# Patient Record
Sex: FEMALE | Race: BLACK OR AFRICAN AMERICAN | Employment: UNEMPLOYED | URBAN - METROPOLITAN AREA
[De-identification: names, ages, dates, MRNs, and addresses within clinical notes are randomized per-mention and may not be internally consistent; named-entity substitution may affect disease eponyms.]

---

## 2021-02-08 ENCOUNTER — OFFICE VISIT (OUTPATIENT)
Dept: FAMILY MEDICINE CLINIC | Age: 61
End: 2021-02-08
Payer: COMMERCIAL

## 2021-02-08 VITALS
DIASTOLIC BLOOD PRESSURE: 80 MMHG | TEMPERATURE: 96.7 F | HEIGHT: 65 IN | RESPIRATION RATE: 16 BRPM | BODY MASS INDEX: 32.15 KG/M2 | HEART RATE: 88 BPM | WEIGHT: 193 LBS | SYSTOLIC BLOOD PRESSURE: 128 MMHG

## 2021-02-08 DIAGNOSIS — R10.13 DYSPEPSIA: ICD-10-CM

## 2021-02-08 DIAGNOSIS — F43.10 PTSD (POST-TRAUMATIC STRESS DISORDER): ICD-10-CM

## 2021-02-08 DIAGNOSIS — M17.11 ARTHRITIS OF RIGHT KNEE: ICD-10-CM

## 2021-02-08 DIAGNOSIS — Z79.52 IMMUNOSUPPRESSION DUE TO CHRONIC STEROID USE (HCC): ICD-10-CM

## 2021-02-08 DIAGNOSIS — D84.821 IMMUNOSUPPRESSION DUE TO CHRONIC STEROID USE (HCC): ICD-10-CM

## 2021-02-08 DIAGNOSIS — F41.1 GENERALIZED ANXIETY DISORDER: ICD-10-CM

## 2021-02-08 DIAGNOSIS — Z95.0 PACEMAKER: ICD-10-CM

## 2021-02-08 DIAGNOSIS — M32.12 SYSTEMIC LUPUS ERYTHEMATOSUS (SLE) WITH PERICARDITIS, UNSPECIFIED SLE TYPE (HCC): Primary | ICD-10-CM

## 2021-02-08 DIAGNOSIS — E55.9 VITAMIN D DEFICIENCY: ICD-10-CM

## 2021-02-08 DIAGNOSIS — M54.9 CHRONIC BACK PAIN, UNSPECIFIED BACK LOCATION, UNSPECIFIED BACK PAIN LATERALITY: ICD-10-CM

## 2021-02-08 DIAGNOSIS — Z87.411 HISTORY OF VAGINAL DYSPLASIA: ICD-10-CM

## 2021-02-08 DIAGNOSIS — G89.29 CHRONIC BACK PAIN, UNSPECIFIED BACK LOCATION, UNSPECIFIED BACK PAIN LATERALITY: ICD-10-CM

## 2021-02-08 DIAGNOSIS — Z87.410 HISTORY OF CERVICAL DYSPLASIA: ICD-10-CM

## 2021-02-08 DIAGNOSIS — T38.0X5A IMMUNOSUPPRESSION DUE TO CHRONIC STEROID USE (HCC): ICD-10-CM

## 2021-02-08 PROCEDURE — G8427 DOCREV CUR MEDS BY ELIG CLIN: HCPCS | Performed by: NURSE PRACTITIONER

## 2021-02-08 PROCEDURE — G8484 FLU IMMUNIZE NO ADMIN: HCPCS | Performed by: NURSE PRACTITIONER

## 2021-02-08 PROCEDURE — 1036F TOBACCO NON-USER: CPT | Performed by: NURSE PRACTITIONER

## 2021-02-08 PROCEDURE — 99204 OFFICE O/P NEW MOD 45 MIN: CPT | Performed by: NURSE PRACTITIONER

## 2021-02-08 PROCEDURE — 3017F COLORECTAL CA SCREEN DOC REV: CPT | Performed by: NURSE PRACTITIONER

## 2021-02-08 PROCEDURE — G8417 CALC BMI ABV UP PARAM F/U: HCPCS | Performed by: NURSE PRACTITIONER

## 2021-02-08 RX ORDER — FLUOXETINE 20 MG/1
20 TABLET, FILM COATED ORAL DAILY
COMMUNITY
End: 2021-03-22

## 2021-02-08 RX ORDER — DOXEPIN HYDROCHLORIDE 25 MG/1
50 CAPSULE ORAL NIGHTLY
COMMUNITY
End: 2021-03-22 | Stop reason: SDUPTHER

## 2021-02-08 RX ORDER — BLOOD-GLUCOSE METER
1 KIT MISCELLANEOUS DAILY
COMMUNITY
End: 2022-04-27

## 2021-02-08 RX ORDER — ERGOCALCIFEROL (VITAMIN D2) 1250 MCG
50000 CAPSULE ORAL WEEKLY
COMMUNITY
End: 2021-02-25

## 2021-02-08 RX ORDER — ALPRAZOLAM 0.5 MG/1
0.5 TABLET ORAL 2 TIMES DAILY PRN
COMMUNITY
End: 2021-03-22 | Stop reason: SDUPTHER

## 2021-02-08 RX ORDER — FAMOTIDINE 40 MG/1
40 TABLET, FILM COATED ORAL DAILY
COMMUNITY
End: 2022-03-28

## 2021-02-08 RX ORDER — PREDNISONE 20 MG/1
20 TABLET ORAL 2 TIMES DAILY
COMMUNITY
End: 2021-12-01

## 2021-02-08 SDOH — HEALTH STABILITY: MENTAL HEALTH: HOW OFTEN DO YOU HAVE A DRINK CONTAINING ALCOHOL?: NEVER

## 2021-02-08 SDOH — ECONOMIC STABILITY: INCOME INSECURITY: HOW HARD IS IT FOR YOU TO PAY FOR THE VERY BASICS LIKE FOOD, HOUSING, MEDICAL CARE, AND HEATING?: NOT HARD AT ALL

## 2021-02-08 SDOH — ECONOMIC STABILITY: TRANSPORTATION INSECURITY
IN THE PAST 12 MONTHS, HAS LACK OF TRANSPORTATION KEPT YOU FROM MEETINGS, WORK, OR FROM GETTING THINGS NEEDED FOR DAILY LIVING?: NO

## 2021-02-08 SDOH — ECONOMIC STABILITY: TRANSPORTATION INSECURITY
IN THE PAST 12 MONTHS, HAS THE LACK OF TRANSPORTATION KEPT YOU FROM MEDICAL APPOINTMENTS OR FROM GETTING MEDICATIONS?: NO

## 2021-02-08 ASSESSMENT — PATIENT HEALTH QUESTIONNAIRE - PHQ9
2. FEELING DOWN, DEPRESSED OR HOPELESS: 0
1. LITTLE INTEREST OR PLEASURE IN DOING THINGS: 0
SUM OF ALL RESPONSES TO PHQ QUESTIONS 1-9: 0

## 2021-02-08 ASSESSMENT — ENCOUNTER SYMPTOMS
COUGH: 0
ABDOMINAL PAIN: 0
NAUSEA: 0
SHORTNESS OF BREATH: 0
BACK PAIN: 1

## 2021-02-08 NOTE — PROGRESS NOTES
Referral made to DR Mario Palafox for 3/9/2021 at 10:30am.  Records faxed to [phone]. Patient notified at appt.

## 2021-02-08 NOTE — PATIENT INSTRUCTIONS
You may receive a survey about your visit with us today. The feedback from our patients helps us identify what is working well and where the service to all patients can be enhanced. Thank you! Tobacco Cessation Programs     Telephonic behavior modification  ? 1-800-QUIT-NOW (550-6951)  ? Counseling service for those who are ready to quit using tobacco.    ? Available for uninsured [place] residents, [place] recipients, pregnant women, or patients whose health plans or employers are members of the [address] [place] [address] behavior modification  ? http://[place]. Quitlogix. org  ? Online support program to help patients through each step of the quitting process. Available 24 hours a day 7 days a week. Provides up to date researched based tool, step-by-step guides, and motivational messages. Online behavior modification  ? www.lungusa.org/stop-smoking/how-to-quit  ? HelpLine: 1-800-LUNG-USA (969-7646)  ? Email questions to:  Stacey@[employer]. All[employer]   ? Website offers resources to help tobacco users figure out their reasons for quitting and then take the big step of quitting for good. Hypnosis  ? Location: [address] [place] [address] [place]  ? Contact: John Douglass, PhD at [phone]  ? Hypnosis for tobacco cessation  ? Cost $225 for the initial session and $175 for each session afterwards. Most patients require 6-8 sessions. There is the option to submit through the patients insurance. Hypnosis and behavior modification  ? Location: [place] [address],  [facility] [address] [place]  ? Contact: Mady Aguilar, PhD at [phone]  ? Counseling and hypnosis for nicotine addition  ? Cost: For uninsured patients:  Please call above phone number  Cost for insured patients depends on patients insurance plan. Behavior modification  ? Location: [facility], [address] [place] [address] [address] [place] AM [place], [place]  ?  Contact: [phone] ? Services include four one-on-one appointments between the patient and a respiratory therapist.  The four appointments span over three weeks. The respiratory therapist schedules one of the appointments to occur 48 hours after the patients quit date. ? Cost $100 total for the four sessions.   Tobacco cessation products are not included in the cost and are not provided by [facility].

## 2021-02-08 NOTE — PROGRESS NOTES
Subjective:      Patient ID: Lacey Cabrera 1960 is a 64 y.o. female here for evaluation. NP to establish care. Former PCP was    Past Medical History:   Diagnosis Date    Anxiety     Depression     PTSD (post-traumatic stress disorder)     Schizoaffective disorder ([facility] [address]        Past Surgical History:   Procedure Laterality Date    FOOT SURGERY Right     PACEMAKER INSERTION  1981       History reviewed. No pertinent family history. Current Outpatient Medications   Medication Sig Dispense Refill    predniSONE (DELTASONE) 20 MG tablet Take 20 mg by mouth 2 times daily      FLUoxetine (PROZAC) 20 MG tablet Take 20 mg by mouth daily      doxepin (SINEQUAN) 25 MG capsule Take 50 mg by mouth nightly      famotidine (PEPCID) 40 MG tablet Take 40 mg by mouth daily      blood glucose test strips (FREESTYLE LITE) strip 1 each by In Vitro route daily As needed  Freestyle Lite      ergocalciferol (ERGOCALCIFEROL) 1.25 MG (92405 UT) capsule Take 50,000 Units by mouth once a week      ALPRAZolam (XANAX) 0.5 MG tablet Take 0.5 mg by mouth 2 times daily as needed for Anxiety. No current facility-administered medications for this visit. Smoke: None    Colon Cancer Screening - 2014 in [address] - denied  Cervical Cancer Screening - due    Chief Complaint   Patient presents with   [facility].     Dr Gladies Gaucher in [facility]     currently on Xanax       Hx of LUPUS. Reason for PACER 40 years ago. On Prednisone 20 mg BID for LUPUS. Denies CP, SOB or chest tightness    High anxiety and PTSD. Hx of schizoaffective disorder. On Prozac  20 mg and Xanax BID    Right knee arthritis. Hx of joint INJ every 6 months. Was also following with a Back Doctor and PAIN DOCTOR. Vitals:    02/08/21 1349   BP: 128/80   Pulse: 88   Resp: 16   Temp: 96.7 °F (35.9 °C)       Recent blood work with PCP with results pending.      No results found for: LABA1C No results found for: EAG    No components found for: CHLPL  No results found for: TRIG  No results found for: HDL  No results found for: LDLCALC  No results found for: LABVLDL      Chemistry    No results found for: NA, K, CL, CO2, BUN, CREATININE No results found for: CALCIUM, ALKPHOS, AST, ALT, BILITOT         No results found for: TSH, A2WOGAK, R3ABLUR, THYROIDAB    No results found for: WBC, HGB, HCT, MCV, PLT      Health Maintenance   Topic Date Due    Hepatitis C screen  1960    HIV screen  09/28/1977    DTaP/Tdap/Td vaccine (1 - Tdap) 09/28/1981    Cervical cancer screen  09/28/1983    Lipid screen  09/28/2002    Diabetes screen  09/28/2002    Breast cancer screen  09/28/2012    Shingles Vaccine (1 of 2) 09/28/2012    Colon cancer screen colonoscopy  09/28/2012    Flu vaccine (1) 09/01/2020    Hepatitis A vaccine  Aged Out    Hepatitis B vaccine  Aged Out    Hib vaccine  Aged Out    Meningococcal (ACWY) vaccine  Aged Out    Pneumococcal 0-64 years Vaccine  Aged Out         There is no immunization history on file for this patient. Review of Systems   Constitutional: Negative for chills and fever. HENT: Negative. Respiratory: Negative for cough and shortness of breath. Cardiovascular: Negative for chest pain. Gastrointestinal: Negative for abdominal pain and nausea. Musculoskeletal: Positive for arthralgias and back pain. Skin: Negative for rash. Neurological: Negative for dizziness, light-headedness and headaches. Psychiatric/Behavioral: Positive for sleep disturbance. The patient is nervous/anxious. Objective:   Physical Exam  Constitutional:       General: She is not in acute distress. Eyes:      Pupils: Pupils are equal, round, and reactive to light. Neck:      Musculoskeletal: Normal range of motion and neck supple. Cardiovascular:      Rate and Rhythm: Normal rate and regular rhythm. Heart sounds: No murmur.    Pulmonary: Effort: Pulmonary effort is normal.      Breath sounds: Normal breath sounds. No wheezing. Abdominal:      General: Bowel sounds are normal. There is no distension. Palpations: Abdomen is soft. Tenderness: There is no abdominal tenderness. Musculoskeletal:      Right knee: She exhibits decreased range of motion. Tenderness found. Lumbar back: She exhibits decreased range of motion and pain. Skin:     General: Skin is warm and dry. Findings: No rash. Assessment:       Diagnosis Orders   1. Systemic lupus erythematosus (SLE) with pericarditis, unspecified SLE type (HCC)  predniSONE (DELTASONE) 20 MG tablet   2. Pacemaker     3. Immunosuppression due to chronic steroid use     4. Arthritis of right knee  blood glucose test strips (FREESTYLE LITE) strip   5. PTSD (post-traumatic stress disorder)  FLUoxetine (PROZAC) 20 MG tablet    doxepin (SINEQUAN) 25 MG capsule    ALPRAZolam (XANAX) 0.5 MG tablet   6. Generalized anxiety disorder  ALPRAZolam (XANAX) 0.5 MG tablet   7. Vitamin D deficiency  ergocalciferol (ERGOCALCIFEROL) 1.25 MG (20208 UT) capsule   8. Chronic back pain, unspecified back location, unspecified back pain laterality     9. History of cervical dysplasia  Diaz Pruitt MD, Obstetrics & Gynecology, [facility]. History of vaginal dysplasia  Diaz Pruitt MD, Obstetrics & Gynecology, [facility].VIERT   11.  Dyspepsia  famotidine (PEPCID) 40 MG tablet           Plan:      Difficult gathering PMhx from patient  Get old records from PCP and Specialist  Will set up with LOCAL SPECIALIST upon review  Refer to OBGYN due to cervical/vaginal dysplasia hx  Chronic conditions stable  Labs just complete at previous PCP - will get records  Unknown Preventative UTD  RTO in 2 weeks to reviewed PMhx            Current Outpatient Medications   Medication Sig Dispense Refill    predniSONE (DELTASONE) 20 MG tablet Take 20 mg by mouth 2 times daily

## 2021-02-25 ENCOUNTER — TELEPHONE (OUTPATIENT)
Dept: CARDIOLOGY CLINIC | Age: 61
End: 2021-02-25

## 2021-02-25 ENCOUNTER — OFFICE VISIT (OUTPATIENT)
Dept: FAMILY MEDICINE CLINIC | Age: 61
End: 2021-02-25
Payer: COMMERCIAL

## 2021-02-25 VITALS
HEART RATE: 76 BPM | RESPIRATION RATE: 20 BRPM | WEIGHT: 196.3 LBS | TEMPERATURE: 97.3 F | BODY MASS INDEX: 32.67 KG/M2 | DIASTOLIC BLOOD PRESSURE: 68 MMHG | SYSTOLIC BLOOD PRESSURE: 114 MMHG

## 2021-02-25 DIAGNOSIS — F43.10 PTSD (POST-TRAUMATIC STRESS DISORDER): ICD-10-CM

## 2021-02-25 DIAGNOSIS — F41.1 GENERALIZED ANXIETY DISORDER: ICD-10-CM

## 2021-02-25 DIAGNOSIS — R04.2 COUGHING UP BLOOD: ICD-10-CM

## 2021-02-25 DIAGNOSIS — Z95.0 PACEMAKER: ICD-10-CM

## 2021-02-25 DIAGNOSIS — J18.1 RIGHT UPPER LOBE CONSOLIDATION (HCC): ICD-10-CM

## 2021-02-25 DIAGNOSIS — Z86.79 HISTORY OF COMPLETE AV BLOCK: ICD-10-CM

## 2021-02-25 DIAGNOSIS — E55.9 VITAMIN D DEFICIENCY: ICD-10-CM

## 2021-02-25 DIAGNOSIS — M17.11 OSTEOARTHRITIS OF RIGHT KNEE, UNSPECIFIED OSTEOARTHRITIS TYPE: ICD-10-CM

## 2021-02-25 DIAGNOSIS — M32.12 SYSTEMIC LUPUS ERYTHEMATOSUS (SLE) WITH PERICARDITIS, UNSPECIFIED SLE TYPE (HCC): Primary | ICD-10-CM

## 2021-02-25 PROCEDURE — G8417 CALC BMI ABV UP PARAM F/U: HCPCS | Performed by: NURSE PRACTITIONER

## 2021-02-25 PROCEDURE — 3017F COLORECTAL CA SCREEN DOC REV: CPT | Performed by: NURSE PRACTITIONER

## 2021-02-25 PROCEDURE — 99215 OFFICE O/P EST HI 40 MIN: CPT | Performed by: NURSE PRACTITIONER

## 2021-02-25 PROCEDURE — 1036F TOBACCO NON-USER: CPT | Performed by: NURSE PRACTITIONER

## 2021-02-25 PROCEDURE — G8427 DOCREV CUR MEDS BY ELIG CLIN: HCPCS | Performed by: NURSE PRACTITIONER

## 2021-02-25 PROCEDURE — G8484 FLU IMMUNIZE NO ADMIN: HCPCS | Performed by: NURSE PRACTITIONER

## 2021-02-25 ASSESSMENT — ENCOUNTER SYMPTOMS
NAUSEA: 0
SHORTNESS OF BREATH: 0
BACK PAIN: 1
COUGH: 1
ABDOMINAL PAIN: 0

## 2021-02-25 NOTE — TELEPHONE ENCOUNTER
NEW PT REFERRAL FROM [facility] . I CALLED PT AND SHE IS DRIVING RIGHT NOW. SHE HAS OUR PHONE NUMBER. SHE IS GOING TO CALL OUR OFFICE AS SOON AS SHE GETS HOME TO SET UP AN APPT WITH DR RAMIREZ. PT TOLD ME SHE HAS A DEVICE IN HER CHEST AND SHE THINKS IT IS AN ICD. SHE IS NOT SURE. SHE SAID SHE HAS NOT HAD IT CHECKED IN YEARS.  SHE MOVED TO [place] AND IS TRYING TO ESTABLISH NEW DRS

## 2021-02-25 NOTE — PROGRESS NOTES
Visit Information    Have you changed or started any medications since your last visit including any over-the-counter medicines, vitamins, or herbal medicines? no   Are you having any side effects from any of your medications? -  no  Have you stopped taking any of your medications? Is so, why? -  yes - tx complete    Have you seen any other physician or provider since your last visit? No  Have you had any other diagnostic tests since your last visit? No  Have you been seen in the emergency room and/or had an admission to a hospital since we last saw you? No  Have you had your routine dental cleaning in the past 6 months? no    Have you activated your [employer] account? If not, what are your barriers?  Yes     Patient Care Team:  ESTHER Peters CNP as PCP - General (Family Nurse Practitioner)  ESTHER Peters CNP as PCP - [facility] Provider    Medical History Review  Past Medical, Family, and Social History reviewed and does contribute to the patient presenting condition    Health Maintenance   Topic Date Due    Hepatitis C screen  1960    Pneumococcal 0-64 years Vaccine (1 of 3 - PCV13) 09/28/1968    HIV screen  09/28/1977    DTaP/Tdap/Td vaccine (1 - Tdap) 09/28/1981    Cervical cancer screen  09/28/1983    Lipid screen  09/28/2002    Diabetes screen  09/28/2002    Breast cancer screen  09/28/2012    Colon cancer screen colonoscopy  09/28/2012    Flu vaccine (1) 09/01/2020    Hepatitis A vaccine  Aged Out    Hepatitis B vaccine  Aged Out    Hib vaccine  Aged Out    Meningococcal (ACWY) vaccine  Aged Out

## 2021-02-25 NOTE — PROGRESS NOTES
Subjective:      Patient ID: Joel Saini 1960 is a 64 y.o. female here for evaluation. NP to establish care. Former PCP was    Chief Complaint   Patient presents with    2 Week Follow-Up     Smoke: None    Colon Cancer Screening - 2014 in [address] - denied    Hx of LUPUS. Reason for PACER 40 years ago. On Prednisone 20 mg BID PRN for LUPUS. Denies CP, SOB or chest tightness. Coughing up blood. Off and on. Seen PULM in past.  No follow up to CT abnormal in 2019. Denies weight loss. Denies night sweats. Denies frequent cough. CT CHEST 7/2019  IMPRESSION:       1.  Right upper lobe spiculated nodular opacity, highly suspicious for     malignancy.  Recommend follow-up as described below.    2.  No evidence of bulky intrathoracic lymphadenopathy.        Incidental Finding:  Follow-up for this incidentally detected lung nodule     with PET/CT or biopsy within 4 weeks, or chest CT exam in 3 months is     recommended.      Hx of PACER in late 90s. Seen CARDIO in mid 2010s per notes that pacer was non-operational.  Heart was pacing without. Pacer was left due no issues despite non-functional status. Patient has not seen CARDIO since. High anxiety and PTSD. Hx of schizoaffective disorder. On Prozac  20 mg and Xanax BID    Right knee arthritis. Hx of joint INJ every 6 months. Was also following with a Back Doctor and PAIN DOCTOR. Vitals:    02/25/21 1317   BP: 114/68   Pulse: 76   Resp: 20   Temp: 97.3 °F (36.3 °C)       Recent blood work with PCP with results pending.      No results found for: LABA1C  No results found for: EAG    No components found for: CHLPL  No results found for: TRIG  No results found for: HDL  No results found for: LDLCALC  No results found for: LABVLDL      Chemistry    No results found for: NA, K, CL, CO2, BUN, CREATININE No results found for: CALCIUM, ALKPHOS, AST, ALT, BILITOT         No results found for: TSH, L7TGLPU, K0FGGSW,

## 2021-02-26 ENCOUNTER — TELEPHONE (OUTPATIENT)
Dept: FAMILY MEDICINE CLINIC | Age: 61
End: 2021-02-26

## 2021-02-26 NOTE — TELEPHONE ENCOUNTER
Jenny referred Queenie to Dr Maurizio Lake, Please order CMP, all other required bloodwork has been ordered.

## 2021-02-26 NOTE — TELEPHONE ENCOUNTER
Has been scheduled with device clinic with note to schedule with henrique, wanted to see what the device is doing first

## 2021-03-03 ENCOUNTER — OFFICE VISIT (OUTPATIENT)
Dept: CARDIOLOGY CLINIC | Age: 61
End: 2021-03-03
Payer: COMMERCIAL

## 2021-03-03 ENCOUNTER — NURSE ONLY (OUTPATIENT)
Dept: CARDIOLOGY CLINIC | Age: 61
End: 2021-03-03

## 2021-03-03 ENCOUNTER — HOSPITAL ENCOUNTER (OUTPATIENT)
Dept: GENERAL RADIOLOGY | Age: 61
Discharge: HOME | End: 2021-03-03
Payer: COMMERCIAL

## 2021-03-03 ENCOUNTER — HOSPITAL ENCOUNTER (OUTPATIENT)
Age: 61
Discharge: HOME | End: 2021-03-03
Payer: COMMERCIAL

## 2021-03-03 VITALS
DIASTOLIC BLOOD PRESSURE: 86 MMHG | HEART RATE: 67 BPM | WEIGHT: 204 LBS | BODY MASS INDEX: 32.78 KG/M2 | SYSTOLIC BLOOD PRESSURE: 123 MMHG | HEIGHT: 66 IN

## 2021-03-03 DIAGNOSIS — Z95.0 PACEMAKER: Primary | ICD-10-CM

## 2021-03-03 DIAGNOSIS — M32.12 SYSTEMIC LUPUS ERYTHEMATOSUS (SLE) WITH PERICARDITIS, UNSPECIFIED SLE TYPE (HCC): ICD-10-CM

## 2021-03-03 DIAGNOSIS — Z95.0 PACEMAKER: ICD-10-CM

## 2021-03-03 DIAGNOSIS — J18.1 RIGHT UPPER LOBE CONSOLIDATION (HCC): ICD-10-CM

## 2021-03-03 LAB
BUN BLDV-MCNC: 12 MG/DL (ref 7–22)
C-REACTIVE PROTEIN: 1.13 MG/DL (ref 0–1)
CREAT SERPL-MCNC: 0.7 MG/DL (ref 0.4–1.2)
ERYTHROCYTE [DISTWIDTH] IN BLOOD BY AUTOMATED COUNT: 13.9 % (ref 11.5–14.5)
ERYTHROCYTE [DISTWIDTH] IN BLOOD BY AUTOMATED COUNT: 42.6 FL (ref 35–45)
HCT VFR BLD CALC: 38.4 % (ref 37–47)
HEMOGLOBIN: 12.1 GM/DL (ref 12–16)
MCH RBC QN AUTO: 26.5 PG (ref 26–33)
MCHC RBC AUTO-ENTMCNC: 31.5 GM/DL (ref 32.2–35.5)
MCV RBC AUTO: 84.2 FL (ref 81–99)
PLATELET # BLD: 300 THOU/MM3 (ref 130–400)
PMV BLD AUTO: 9.2 FL (ref 9.4–12.4)
RBC # BLD: 4.56 MILL/MM3 (ref 4.2–5.4)
RHEUMATOID FACTOR: < 10 IU/ML (ref 0–13)
SEDIMENTATION RATE, ERYTHROCYTE: 32 MM/HR (ref 0–20)
WBC # BLD: 4.3 THOU/MM3 (ref 4.8–10.8)

## 2021-03-03 PROCEDURE — 86140 C-REACTIVE PROTEIN: CPT

## 2021-03-03 PROCEDURE — G8417 CALC BMI ABV UP PARAM F/U: HCPCS | Performed by: INTERNAL MEDICINE

## 2021-03-03 PROCEDURE — 3017F COLORECTAL CA SCREEN DOC REV: CPT | Performed by: INTERNAL MEDICINE

## 2021-03-03 PROCEDURE — 82565 ASSAY OF CREATININE: CPT

## 2021-03-03 PROCEDURE — 85027 COMPLETE CBC AUTOMATED: CPT

## 2021-03-03 PROCEDURE — 83516 IMMUNOASSAY NONANTIBODY: CPT

## 2021-03-03 PROCEDURE — 86038 ANTINUCLEAR ANTIBODIES: CPT

## 2021-03-03 PROCEDURE — 99203 OFFICE O/P NEW LOW 30 MIN: CPT | Performed by: INTERNAL MEDICINE

## 2021-03-03 PROCEDURE — G8427 DOCREV CUR MEDS BY ELIG CLIN: HCPCS | Performed by: INTERNAL MEDICINE

## 2021-03-03 PROCEDURE — G8484 FLU IMMUNIZE NO ADMIN: HCPCS | Performed by: INTERNAL MEDICINE

## 2021-03-03 PROCEDURE — 85651 RBC SED RATE NONAUTOMATED: CPT

## 2021-03-03 PROCEDURE — 71046 X-RAY EXAM CHEST 2 VIEWS: CPT

## 2021-03-03 PROCEDURE — 93000 ELECTROCARDIOGRAM COMPLETE: CPT | Performed by: INTERNAL MEDICINE

## 2021-03-03 PROCEDURE — 86430 RHEUMATOID FACTOR TEST QUAL: CPT

## 2021-03-03 PROCEDURE — 1036F TOBACCO NON-USER: CPT | Performed by: INTERNAL MEDICINE

## 2021-03-03 PROCEDURE — 84520 ASSAY OF UREA NITROGEN: CPT

## 2021-03-03 PROCEDURE — 36415 COLL VENOUS BLD VENIPUNCTURE: CPT

## 2021-03-03 NOTE — PROGRESS NOTES
[facility]  [place]pt: [phone]         CARDIAC ELECTROPHYSIOLOGY: CONSULTATION NOTE  PATIENT DEMOGRAPHICS:  Date:   3/3/2021  Patient name:              Karen Nicole  YOB: 1960  Sex: female   MRN:   [id number]    PRIMARY CARE PHYSICIAN:   ESTHER Busby CNP    REFERRING PROVIDER:  Self referred. REASON FOR CONSULTATION:  Establish care care with [facility]. HISTORY OF PRESENT ILLNESS:  Ms. Osman Goode is a 64years old lady with history of sinus arrest with asystole resulting in syncope and seizures. She underwent dual-chamber pacemaker implantation in 1981. During evaluation in 2002 broken pacemaker leads. Since patient did not have recurrent syncope a conservative management was recommended. She recently moved from family to live in [facility] and wants to establish cardiac care with a Verneta Lennox. Her medical comorbidities include obesity, systemic lupus erythematosus (in remission) and hyperlipidemia. She complains of intermittent mild hemoptysis. She is scheduled to have a CT angiogram of the chest. Denies chest pain, shortness of breath, palpitation, syncope or lower extremity swelling. Active and functional.    REVIEW OF SYSTEMS:    Constitutional: Negative for chills and fever  HENT: Negative for congestion, sinus pressure, sneezing and sore throat. Eyes: Negative for pain, discharge, redness and itching. Respiratory: Negative for apnea, cough  Gastrointestinal: Negative for blood in stool, constipation, diarrhea   Endocrine: Negative for cold intolerance, heat intolerance, polydipsia. Genitourinary: Negative for dysuria, enuresis, flank pain and hematuria. Musculoskeletal: Negative for arthralgias, joint swelling and neck pain. Neurological: Negative for numbness and headaches.    Psychiatric/Behavioral: Negative for agitation, confusion, decreased concentration and dysphoric mood.      PAST MEDICAL HISTORY:   Sinus arrest with asystole status post dual-chamber pacemaker implantation in . Broken pacemaker leads (etiology not known). 2.  Systemic lupus erythematosus in remission. on prednisone. 3.  Hyperlipidemia. 4.  Chronic ulcer pulmonary disease, mild. 5.  Gastroesophageal reflux disease. 6.  Hemoptysis under evaluation. Past Medical History:   Diagnosis Date    Anxiety     Depression     PTSD (post-traumatic stress disorder)     Schizoaffective disorder (HCC)        PSH:  Past Surgical History:   Procedure Laterality Date    FOOT SURGERY Right     PACEMAKER INSERTION         FAMILY HISTORY:  No family history on file. SOCIAL HISTORY:  Denies smoking or alcohol use. She has one daughter. She is single.   Social History     Socioeconomic History    Marital status: Single     Spouse name: None    Number of children: 1    Years of education: 15    Highest education level: Associate degree: academic program   Occupational History    None   Social Needs    Financial resource strain: Not hard at all   [employer] insecurity     Worry: Never true     Inability: Never true   [employer] needs     Medical: No     Non-medical: No   Tobacco Use    Smoking status: Former Smoker     Packs/day: 0.25     Years: 30.00     Pack years: 7.50     Types: Cigarettes     Quit date: 2009     Years since quittin.1    Smokeless tobacco: Never Used   Substance and Sexual Activity    Alcohol use: Never     Frequency: Never    Drug use: Not Currently     Types: Marijuana     Comment:     Sexual activity: None   Lifestyle    Physical activity     Days per week: None     Minutes per session: None    Stress: None   Relationships    Social connections     Talks on phone: None     Gets together: None     Attends Christianity service: None     Active member of club or organization: None     Attends meetings of clubs or organizations: None     Relationship status: None    Intimate partner violence     Fear of current or ex partner: None     Emotionally abused: None     Physically abused: None     Forced sexual activity: None   Other Topics Concern    None   Social History Narrative    None        ALLERGY HISTORY:  Allergies   Allergen Reactions    Naproxen Other (See Comments)     ears ring        MEDICATIONS:  Current Outpatient Medications   Medication Sig Dispense Refill    predniSONE (DELTASONE) 20 MG tablet Take 20 mg by mouth 2 times daily      FLUoxetine (PROZAC) 20 MG tablet Take 20 mg by mouth daily      doxepin (SINEQUAN) 25 MG capsule Take 50 mg by mouth nightly      famotidine (PEPCID) 40 MG tablet Take 40 mg by mouth daily      ALPRAZolam (XANAX) 0.5 MG tablet Take 0.5 mg by mouth 2 times daily as needed for Anxiety.  blood glucose test strips (FREESTYLE LITE) strip 1 each by In Vitro route daily As needed  Freestyle Lite       No current facility-administered medications for this visit. PHYSICAL EXAM:  Vitals:    03/03/21 1323   BP: 123/86   Pulse: 67     Body mass index is 32.93 kg/m². GENERAL: Alert and oriented. No distress. EYES: No pallor or icterus. ENT: No central cyanosis. VESSELS: No jugular venous distension or carotid bruits. HEART: Normal S1/S2. No murmur, rub or gallop. LUNGS: Clear to auscultation. ABDOMEN: Soft and non-tender. EXTREMITIES: No lower extremity edema. Feet are warm. NEUROLOGICAL: Grossly intact. LABORATORY DATA AND DIAGNOSTIC DATA:  I have personally reviewed and interpreted the results of the following diagnostic testing    No results found for: WBC, HGB, HCT, PLT, CHOL, TRIG, HDL, LDLDIRECT, ALT, AST, NA, K, CL, CREATININE, BUN, CO2, TSH, INR, GLUF, LABA1C, LABMICR     Echocardiogram from outside hospital in 2017: LVEF 50%. Mild ex regurgitation. ECG sinus rhythm and isolated premature atrial complexes.   Device interrogation: [facility]. No connection between the  and device could be made. Chest x-ray: Fractured both atrial and ventricular lead. T    IMPRESSION:  1. History of sinus arrest/asystole resulting in seizures, no recurrences. 2.  Dual-chamber pacemaker implanted with fractured leads. 3.  Obesity, BMI 32 kg/m². 4.  Hemoptysis under evaluation. Plan to undergo CT angiogram of the chest.  5.  Systemic lupus erythematosus in remission, on prednisone. ASSESSMENT AND RECOMMENDATIONS:  The patient is known to have fractured pacemaker leads. He has had no recurrent syncope/seizures or evidence of bradycardia arrhythmias. No connection between the [facility] and pacemaker generator was established. Since, she is currently free from bradyarrhythmias we will proceed with conservative management. I did explain to her that lead in her case will be a high risk procedure. If she has recurrent bradyarrhythmias we may consider implantation of the new system. She is planned to have a CT angiogram of the chest to make sure the broken leads are not causing any lung complications. I discussed the management plan with the patient in details. She verbalized understanding of the discussion. If documented to have a pulmonary complication from fracture leads we will see her sooner otherwise follow-up in a year.     Electronically signed by Shefali Espinosa MD, Pippa Sabillon on 3/3/2021 at 2:11 PM

## 2021-03-03 NOTE — PROGRESS NOTES
This pt came to our office today. She moved to this area from [place] . She said she has not had her device checked in a long time. She used to see dr Melchor Smith in [facility]. I called there to get records / they told me she has not been there since 2017. She will fax me what she has .  We were unable to do a device check since her device WAS PLACED IN 1981  WE BROUGHT DR RAMIREZ IN TO SEE THIS PT/ HE WANTS HER TO GO DOWN AND GET A CHEST XRAY AND COME BACK TO THE OFFICE SO HE CAN SEE HER

## 2021-03-03 NOTE — PROGRESS NOTES
EKG obtained    Patient denies light headed, dizziness, SOB, palpitations or heart racing sensations.

## 2021-03-06 LAB — ANA SCREEN: NORMAL

## 2021-03-08 LAB — MISC. #1 REFERENCE GROUP TEST: NORMAL

## 2021-03-12 ENCOUNTER — OFFICE VISIT (OUTPATIENT)
Dept: FAMILY MEDICINE CLINIC | Age: 61
End: 2021-03-12
Payer: COMMERCIAL

## 2021-03-12 VITALS
DIASTOLIC BLOOD PRESSURE: 68 MMHG | RESPIRATION RATE: 20 BRPM | WEIGHT: 201.9 LBS | TEMPERATURE: 97.2 F | HEART RATE: 88 BPM | SYSTOLIC BLOOD PRESSURE: 112 MMHG | BODY MASS INDEX: 32.59 KG/M2

## 2021-03-12 DIAGNOSIS — M25.50 ARTHRALGIA OF MULTIPLE JOINTS: ICD-10-CM

## 2021-03-12 DIAGNOSIS — F41.1 GENERALIZED ANXIETY DISORDER: ICD-10-CM

## 2021-03-12 DIAGNOSIS — J18.1 RIGHT UPPER LOBE CONSOLIDATION (HCC): ICD-10-CM

## 2021-03-12 DIAGNOSIS — M17.11 OSTEOARTHRITIS OF RIGHT KNEE, UNSPECIFIED OSTEOARTHRITIS TYPE: ICD-10-CM

## 2021-03-12 DIAGNOSIS — M32.12 SYSTEMIC LUPUS ERYTHEMATOSUS (SLE) WITH PERICARDITIS, UNSPECIFIED SLE TYPE (HCC): Primary | ICD-10-CM

## 2021-03-12 DIAGNOSIS — Z95.0 PACEMAKER: ICD-10-CM

## 2021-03-12 DIAGNOSIS — F43.10 PTSD (POST-TRAUMATIC STRESS DISORDER): ICD-10-CM

## 2021-03-12 PROCEDURE — G8417 CALC BMI ABV UP PARAM F/U: HCPCS | Performed by: NURSE PRACTITIONER

## 2021-03-12 PROCEDURE — G8484 FLU IMMUNIZE NO ADMIN: HCPCS | Performed by: NURSE PRACTITIONER

## 2021-03-12 PROCEDURE — 1036F TOBACCO NON-USER: CPT | Performed by: NURSE PRACTITIONER

## 2021-03-12 PROCEDURE — G8427 DOCREV CUR MEDS BY ELIG CLIN: HCPCS | Performed by: NURSE PRACTITIONER

## 2021-03-12 PROCEDURE — 3017F COLORECTAL CA SCREEN DOC REV: CPT | Performed by: NURSE PRACTITIONER

## 2021-03-12 PROCEDURE — 99214 OFFICE O/P EST MOD 30 MIN: CPT | Performed by: NURSE PRACTITIONER

## 2021-03-12 ASSESSMENT — ENCOUNTER SYMPTOMS
NAUSEA: 0
COUGH: 1
ABDOMINAL PAIN: 0
BACK PAIN: 1
SHORTNESS OF BREATH: 0

## 2021-03-12 NOTE — PROGRESS NOTES
Visit Information    Have you changed or started any medications since your last visit including any over-the-counter medicines, vitamins, or herbal medicines? no   Are you having any side effects from any of your medications? -  no  Have you stopped taking any of your medications? Is so, why? -  no    Have you seen any other physician or provider since your last visit? No  Have you had any other diagnostic tests since your last visit? No  Have you been seen in the emergency room and/or had an admission to a hospital since we last saw you? No  Have you had your routine dental cleaning in the past 6 months? no    Have you activated your [employer] account? If not, what are your barriers?  Yes     Patient Care Team:  ESTHER Ricks CNP as PCP - General (Family Nurse Practitioner)  ESTHER Ricks CNP as PCP - [facility] Provider    Medical History Review  Past Medical, Family, and Social History reviewed and does contribute to the patient presenting condition    Health Maintenance   Topic Date Due    Hepatitis C screen  Never done    Pneumococcal 0-64 years Vaccine (1 of 3 - PCV13) Never done    HIV screen  Never done    DTaP/Tdap/Td vaccine (1 - Tdap) Never done    Cervical cancer screen  Never done    Diabetes screen  Never done    Breast cancer screen  Never done    Colon cancer screen colonoscopy  Never done    Flu vaccine (1) Never done    COVID-19 Vaccine (2 - Moderna 2-dose series) 03/26/2021    Lipid screen  02/04/2026    Hepatitis A vaccine  Aged Out    Hepatitis B vaccine  Aged Out    Hib vaccine  Aged Out    Meningococcal (ACWY) vaccine  Aged Out

## 2021-03-12 NOTE — PROGRESS NOTES
Subjective:      Patient ID: Anna Valera 1960 is a 64 y.o. female here for evaluation. NP to establish care. Former PCP was    Chief Complaint   Patient presents with    Discuss Labs     Smoke: None    Colon Cancer Screening - 2014 in [address] - denied    Hx of LUPUS. Reason for PACER 40 years ago. On Prednisone 20 mg BID PRN for LUPUS. Denies CP, SOB or chest tightness. Coughing up blood. Off and on. Seen PULM in past.  No follow up to CT abnormal in 2019. Denies weight loss. Denies night sweats. Denies frequent cough. Scheduled for CT Chest on 4/7/21. Would like to see Lory Scales. CT CHEST 7/2019  IMPRESSION:       1.  Right upper lobe spiculated nodular opacity, highly suspicious for     malignancy.  Recommend follow-up as described below.    2.  No evidence of bulky intrathoracic lymphadenopathy.        Incidental Finding:  Follow-up for this incidentally detected lung nodule     with PET/CT or biopsy within 4 weeks, or chest CT exam in 3 months is     recommended.      Hx of PACER in late 90s. Seen CARDIO in mid 2010s per notes that pacer was non-operational.  Heart was pacing without. Pacer was left due no issues despite non-functional status. Seen CARDIO - awaiting CT results to make sure leads are not affecting lungs. High anxiety and PTSD. Hx of schizoaffective disorder. On Prozac  20 mg and Xanax BID    Right knee arthritis. Hx of joint INJ every 6 months. Was also following with a Back Doctor and PAIN DOCTOR. She is holding off on ORTHO until lungs are clear. Vitals:    03/12/21 1053   BP: 112/68   Pulse: 88   Resp: 20   Temp: 97.2 °F (36.2 °C)       Labs reviewed.      No results found for: LABA1C  No results found for: EAG    No components found for: CHLPL  No results found for: TRIG  No results found for: HDL  No results found for: LDLCALC  No results found for: LABVLDL      Chemistry        Component Value Date/Time    BUN 12 Pulmonary effort is normal.      Breath sounds: Normal breath sounds. No wheezing. Abdominal:      General: Bowel sounds are normal. There is no distension. Palpations: Abdomen is soft. Tenderness: There is no abdominal tenderness. Musculoskeletal:      Right knee: She exhibits decreased range of motion. Tenderness found. Lumbar back: She exhibits decreased range of motion and pain. Skin:     General: Skin is warm and dry. Findings: No rash. Assessment:       Diagnosis Orders   1. Systemic lupus erythematosus (SLE) with pericarditis, unspecified SLE type [facility])  External Referral To Rheumatology   2. Arthralgia of multiple joints  External Referral To Rheumatology   3. Pacemaker     4. Right upper lobe consolidation ([address]     5. Osteoarthritis of right knee, unspecified osteoarthritis type     6. PTSD (post-traumatic stress disorder)     7. Generalized anxiety disorder             Plan:      Refer to [address] [place] [address] per patient requesdt  Refer to [address] [facility] upon CT completion  CT Chest pending 4/7/21   - abnormal right upper lobe in 2019 with no follow up  Chronic conditions stable  Labs reviewed  Healthy Lifestyles discussed  KNA            Current Outpatient Medications   Medication Sig Dispense Refill    predniSONE (DELTASONE) 20 MG tablet Take 20 mg by mouth 2 times daily      FLUoxetine (PROZAC) 20 MG tablet Take 20 mg by mouth daily      doxepin (SINEQUAN) 25 MG capsule Take 50 mg by mouth nightly      famotidine (PEPCID) 40 MG tablet Take 40 mg by mouth daily      blood glucose test strips (FREESTYLE LITE) strip 1 each by In Vitro route daily As needed  Freestyle Lite      ALPRAZolam (XANAX) 0.5 MG tablet Take 0.5 mg by mouth 2 times daily as needed for Anxiety. No current facility-administered medications for this visit.

## 2021-03-15 ENCOUNTER — TELEPHONE (OUTPATIENT)
Dept: FAMILY MEDICINE CLINIC | Age: 61
End: 2021-03-15

## 2021-03-15 NOTE — TELEPHONE ENCOUNTER
Referral to [facility] faxed today to [phone]. Pt aware the they will call her to schedule. Referral form given to provider to sign for faxing. OV 3/12/21 also attached.

## 2021-03-17 ENCOUNTER — HOSPITAL ENCOUNTER (OUTPATIENT)
Age: 61
Discharge: HOME | End: 2021-03-17
Payer: COMMERCIAL

## 2021-03-17 LAB
GFR SERPL CREATININE-BSD FRML MDRD: > 90 ML/MIN/1.73M2
HEPATITIS B SURFACE ANTIGEN: NEGATIVE
HEPATITIS C ANTIBODY: NEGATIVE

## 2021-03-17 PROCEDURE — 36415 COLL VENOUS BLD VENIPUNCTURE: CPT

## 2021-03-17 PROCEDURE — 86592 SYPHILIS TEST NON-TREP QUAL: CPT

## 2021-03-17 PROCEDURE — 87389 HIV-1 AG W/HIV-1&-2 AB AG IA: CPT

## 2021-03-17 PROCEDURE — 86803 HEPATITIS C AB TEST: CPT

## 2021-03-17 PROCEDURE — 87340 HEPATITIS B SURFACE AG IA: CPT

## 2021-03-18 LAB
HIV AG/AB: NONREACTIVE
RPR: NONREACTIVE

## 2021-03-22 DIAGNOSIS — F41.1 GENERALIZED ANXIETY DISORDER: ICD-10-CM

## 2021-03-22 DIAGNOSIS — F43.10 PTSD (POST-TRAUMATIC STRESS DISORDER): ICD-10-CM

## 2021-03-22 DIAGNOSIS — M32.12 SYSTEMIC LUPUS ERYTHEMATOSUS (SLE) WITH PERICARDITIS, UNSPECIFIED SLE TYPE (HCC): ICD-10-CM

## 2021-03-22 RX ORDER — PREDNISONE 20 MG/1
20 TABLET ORAL 2 TIMES DAILY
Status: CANCELLED | OUTPATIENT
Start: 2021-03-22

## 2021-03-22 RX ORDER — ALPRAZOLAM 0.5 MG/1
0.5 TABLET ORAL 2 TIMES DAILY PRN
Qty: 30 TABLET | Refills: 0 | Status: SHIPPED | OUTPATIENT
Start: 2021-03-22 | End: 2021-04-21

## 2021-03-22 RX ORDER — DOXEPIN HYDROCHLORIDE 50 MG/1
50 CAPSULE ORAL NIGHTLY
Qty: 30 CAPSULE | Refills: 11 | Status: SHIPPED | OUTPATIENT
Start: 2021-03-22 | End: 2022-07-13

## 2021-03-22 RX ORDER — ROSUVASTATIN CALCIUM 10 MG/1
TABLET, COATED ORAL
COMMUNITY
Start: 2021-03-15 | End: 2021-12-01

## 2021-03-22 RX ORDER — FLUOXETINE HYDROCHLORIDE 20 MG/1
CAPSULE ORAL
COMMUNITY
Start: 2021-03-18 | End: 2021-12-01 | Stop reason: SDUPTHER

## 2021-03-22 NOTE — TELEPHONE ENCOUNTER
Pt notified and voiced understanding. Pt feels she doing ok with her current medication for anxiety and wants NO changes made at this time. Declines VV as well.

## 2021-03-22 NOTE — TELEPHONE ENCOUNTER
Deng Puckett called requesting a refill on the following medications:  Requested Prescriptions     Pending Prescriptions Disp Refills    doxepin (SINEQUAN) 25 MG capsule 30 capsule      Sig: Take 2 capsules by mouth nightly    predniSONE (DELTASONE) 20 MG tablet        Sig: Take 1 tablet by mouth 2 times daily    ALPRAZolam (XANAX) 0.5 MG tablet        Sig: Take 1 tablet by mouth 2 times daily as needed for Anxiety.      Pharmacy verified: yes      Date of last visit:   Date of next visit (if applicable): 4/66/0503

## 2021-03-30 ENCOUNTER — TELEPHONE (OUTPATIENT)
Dept: FAMILY MEDICINE CLINIC | Age: 61
End: 2021-03-30

## 2021-03-30 NOTE — TELEPHONE ENCOUNTER
Pt is calling and needs the CT Scan for 4/07/2021 changed from [facility] to [facility] [place] for the same day.   Please advise pt at [phone]

## 2021-04-07 NOTE — TELEPHONE ENCOUNTER
Pt came into office stating [facility] did not receive CT order (original fax number was not correct) Order re-faxed to [phone]. Copy of order also given to pt.

## 2021-06-01 ENCOUNTER — OFFICE VISIT (OUTPATIENT)
Dept: FAMILY MEDICINE CLINIC | Age: 61
End: 2021-06-01
Payer: COMMERCIAL

## 2021-06-01 VITALS
SYSTOLIC BLOOD PRESSURE: 134 MMHG | BODY MASS INDEX: 33.98 KG/M2 | DIASTOLIC BLOOD PRESSURE: 80 MMHG | HEART RATE: 85 BPM | RESPIRATION RATE: 16 BRPM | OXYGEN SATURATION: 96 % | WEIGHT: 210.5 LBS

## 2021-06-01 DIAGNOSIS — M25.50 ARTHRALGIA OF MULTIPLE JOINTS: ICD-10-CM

## 2021-06-01 DIAGNOSIS — R91.1 PULMONARY NODULE: ICD-10-CM

## 2021-06-01 DIAGNOSIS — M54.9 CHRONIC BACK PAIN, UNSPECIFIED BACK LOCATION, UNSPECIFIED BACK PAIN LATERALITY: ICD-10-CM

## 2021-06-01 DIAGNOSIS — M17.11 ARTHRITIS OF RIGHT KNEE: ICD-10-CM

## 2021-06-01 DIAGNOSIS — J18.1 RIGHT UPPER LOBE CONSOLIDATION (HCC): ICD-10-CM

## 2021-06-01 DIAGNOSIS — E55.9 VITAMIN D DEFICIENCY: ICD-10-CM

## 2021-06-01 DIAGNOSIS — M32.12 SYSTEMIC LUPUS ERYTHEMATOSUS (SLE) WITH PERICARDITIS, UNSPECIFIED SLE TYPE (HCC): Primary | ICD-10-CM

## 2021-06-01 DIAGNOSIS — F41.1 GENERALIZED ANXIETY DISORDER: ICD-10-CM

## 2021-06-01 DIAGNOSIS — G89.29 CHRONIC BACK PAIN, UNSPECIFIED BACK LOCATION, UNSPECIFIED BACK PAIN LATERALITY: ICD-10-CM

## 2021-06-01 DIAGNOSIS — F43.10 PTSD (POST-TRAUMATIC STRESS DISORDER): ICD-10-CM

## 2021-06-01 DIAGNOSIS — R04.2 COUGHING UP BLOOD: ICD-10-CM

## 2021-06-01 PROCEDURE — 99214 OFFICE O/P EST MOD 30 MIN: CPT | Performed by: NURSE PRACTITIONER

## 2021-06-01 PROCEDURE — 1036F TOBACCO NON-USER: CPT | Performed by: NURSE PRACTITIONER

## 2021-06-01 PROCEDURE — G8427 DOCREV CUR MEDS BY ELIG CLIN: HCPCS | Performed by: NURSE PRACTITIONER

## 2021-06-01 PROCEDURE — 3017F COLORECTAL CA SCREEN DOC REV: CPT | Performed by: NURSE PRACTITIONER

## 2021-06-01 PROCEDURE — G8417 CALC BMI ABV UP PARAM F/U: HCPCS | Performed by: NURSE PRACTITIONER

## 2021-06-01 RX ORDER — ALPRAZOLAM 0.5 MG/1
TABLET ORAL
COMMUNITY
Start: 2021-05-25 | End: 2021-07-01 | Stop reason: SDUPTHER

## 2021-06-01 RX ORDER — ACETAMINOPHEN 500 MG
500-1000 TABLET ORAL EVERY 8 HOURS PRN
Qty: 120 TABLET | Refills: 3 | Status: SHIPPED | OUTPATIENT
Start: 2021-06-01 | End: 2021-06-02 | Stop reason: SDUPTHER

## 2021-06-01 RX ORDER — MELOXICAM 15 MG/1
15 TABLET ORAL DAILY
Qty: 30 TABLET | Refills: 3 | Status: SHIPPED | OUTPATIENT
Start: 2021-06-01 | End: 2021-12-01

## 2021-06-01 ASSESSMENT — ENCOUNTER SYMPTOMS
COUGH: 1
BACK PAIN: 1
NAUSEA: 0
SHORTNESS OF BREATH: 0
ABDOMINAL PAIN: 0

## 2021-06-01 NOTE — PROGRESS NOTES
Visit Information    Have you changed or started any medications since your last visit including any over-the-counter medicines, vitamins, or herbal medicines? no   Are you having any side effects from any of your medications? -  no  Have you stopped taking any of your medications? Is so, why? -  no    Have you seen any other physician or provider since your last visit? No  Have you had any other diagnostic tests since your last visit? No  Have you been seen in the emergency room and/or had an admission to a hospital since we last saw you? No  Have you activated your [employer] account? If not, what are your barriers?  Yes     Patient Care Team:  ESTHER Leung CNP as PCP - General (Family Nurse Practitioner)  ESTHER Leung CNP as PCP - [place] Provider    Medical History Review  Past Medical, Family, and Social History reviewed and does contribute to the patient presenting condition    Health Maintenance   Topic Date Due    Pneumococcal 0-64 years Vaccine (1 of 4 - PCV13) Never done    DTaP/Tdap/Td vaccine (1 - Tdap) Never done    Cervical cancer screen  Never done    Diabetes screen  Never done    Breast cancer screen  Never done    Colon cancer screen colonoscopy  Never done    Flu vaccine (Season Ended) 09/01/2021    Lipid screen  02/04/2022    COVID-19 Vaccine  Completed    Hepatitis C screen  Completed    HIV screen  Completed    Hepatitis A vaccine  Aged Out    Hepatitis B vaccine  Aged Out    Hib vaccine  Aged Out    Meningococcal (ACWY) vaccine  Aged Out

## 2021-06-02 ENCOUNTER — TELEPHONE (OUTPATIENT)
Dept: FAMILY MEDICINE CLINIC | Age: 61
End: 2021-06-02

## 2021-06-02 DIAGNOSIS — M17.11 ARTHRITIS OF RIGHT KNEE: ICD-10-CM

## 2021-06-02 DIAGNOSIS — G89.29 CHRONIC BACK PAIN, UNSPECIFIED BACK LOCATION, UNSPECIFIED BACK PAIN LATERALITY: ICD-10-CM

## 2021-06-02 DIAGNOSIS — M54.9 CHRONIC BACK PAIN, UNSPECIFIED BACK LOCATION, UNSPECIFIED BACK PAIN LATERALITY: ICD-10-CM

## 2021-06-02 RX ORDER — ACETAMINOPHEN 500 MG
500-1000 TABLET ORAL EVERY 8 HOURS PRN
Qty: 120 TABLET | Refills: 3 | Status: SHIPPED | OUTPATIENT
Start: 2021-06-02 | End: 2022-03-07 | Stop reason: SDUPTHER

## 2021-06-02 NOTE — TELEPHONE ENCOUNTER
PA request received from pharmacy for Tylenol 500mg #120 tablets. PA submitted online at [email or web address] and pending review. Message from [email or web address]: Drug is not covered by plan. This medication may be excluded from the patient's benefit. For more information, please reach out to [email or web address] directly at [phone].

## 2021-06-02 NOTE — TELEPHONE ENCOUNTER
Patient calling and requesting we do PA for Tylenol. Aware we will contact the pharmacy for the PA information and submit it to insurance.

## 2021-06-02 NOTE — TELEPHONE ENCOUNTER
Pt notified and voiced understanding. Pt states insurance does cover Tylenol she gave  a number to call silke [phone]. Called and spoke with Nina Whalen, claim was denied due to the [facility] number being billed through [address], updated [address] who voiced understanding. Pt will check with [facility] around 4:30 pm today.

## 2021-06-03 ENCOUNTER — TELEPHONE (OUTPATIENT)
Dept: FAMILY MEDICINE CLINIC | Age: 61
End: 2021-06-03

## 2021-06-03 DIAGNOSIS — M17.11 ARTHRITIS OF RIGHT KNEE: Primary | ICD-10-CM

## 2021-06-29 ENCOUNTER — TELEPHONE (OUTPATIENT)
Dept: FAMILY MEDICINE CLINIC | Age: 61
End: 2021-06-29

## 2021-06-29 NOTE — TELEPHONE ENCOUNTER
Received a call from Alexis serrato with [facility] asking if we had received the office notes for the patient from yesterday  Updated Alexis serrato that we had and they were already scanned into the patients chat. Alexis serrato stated that patient was very upset today when she called in and asked if her records were sent to our office. Alexis serrato did not know why the patient was upset or why she did not want them sent to her PCP. Alexis serrato stated that the patient asked her who was listed as per PCP there at [facility] and Alexis serrato told her no one was list and the patient stated good that is the way she wanted it.   LORI

## 2021-07-01 DIAGNOSIS — F43.10 PTSD (POST-TRAUMATIC STRESS DISORDER): ICD-10-CM

## 2021-07-01 RX ORDER — ALPRAZOLAM 0.5 MG/1
TABLET ORAL
Qty: 60 TABLET | Refills: 0 | Status: SHIPPED | OUTPATIENT
Start: 2021-07-01 | End: 2021-08-05 | Stop reason: SDUPTHER

## 2021-07-01 NOTE — TELEPHONE ENCOUNTER
Pt called office requesting a refill of Xanax to [address]. If no call back she will check with the pharmacy after 3pm. Refill if appropriate.

## 2021-07-28 ENCOUNTER — TELEPHONE (OUTPATIENT)
Dept: FAMILY MEDICINE CLINIC | Age: 61
End: 2021-07-28

## 2021-07-28 DIAGNOSIS — G89.29 CHRONIC BACK PAIN, UNSPECIFIED BACK LOCATION, UNSPECIFIED BACK PAIN LATERALITY: Primary | ICD-10-CM

## 2021-07-28 DIAGNOSIS — M32.12 SYSTEMIC LUPUS ERYTHEMATOSUS (SLE) WITH PERICARDITIS, UNSPECIFIED SLE TYPE (HCC): ICD-10-CM

## 2021-07-28 DIAGNOSIS — M17.11 ARTHRITIS OF RIGHT KNEE: ICD-10-CM

## 2021-07-28 DIAGNOSIS — M54.9 CHRONIC BACK PAIN, UNSPECIFIED BACK LOCATION, UNSPECIFIED BACK PAIN LATERALITY: Primary | ICD-10-CM

## 2021-07-28 DIAGNOSIS — J18.1 RIGHT UPPER LOBE CONSOLIDATION (HCC): ICD-10-CM

## 2021-07-28 NOTE — TELEPHONE ENCOUNTER
----- Message from Myron Almeida sent at 7/28/2021  1:51 PM EDT -----  Subject: Message to Provider    QUESTIONS  Information for Provider? Patient is wondering if she has to come in for   or is it possible to get her tags for handicap (not placard) information   sent to her either by RX or what would be the process. Please call her at   [phone]  ---------------------------------------------------------------------------  --------------  [address]  What is the best way for the office to contact you? OK to leave message on   voicemail, OK to respond with electronic message via [employer] portal (only   for patients who have registered [employer] account)  Preferred Call Back Phone Number? [phone]  ---------------------------------------------------------------------------  --------------  SCRIPT ANSWERS  Relationship to Patient?  Self

## 2021-08-05 ENCOUNTER — OFFICE VISIT (OUTPATIENT)
Dept: FAMILY MEDICINE CLINIC | Age: 61
End: 2021-08-05
Payer: COMMERCIAL

## 2021-08-05 VITALS
DIASTOLIC BLOOD PRESSURE: 72 MMHG | WEIGHT: 206.8 LBS | SYSTOLIC BLOOD PRESSURE: 120 MMHG | BODY MASS INDEX: 33.38 KG/M2 | HEART RATE: 68 BPM | RESPIRATION RATE: 12 BRPM

## 2021-08-05 DIAGNOSIS — M32.12 SYSTEMIC LUPUS ERYTHEMATOSUS (SLE) WITH PERICARDITIS, UNSPECIFIED SLE TYPE (HCC): ICD-10-CM

## 2021-08-05 DIAGNOSIS — F41.1 GENERALIZED ANXIETY DISORDER: ICD-10-CM

## 2021-08-05 DIAGNOSIS — R04.2 COUGHING UP BLOOD: ICD-10-CM

## 2021-08-05 DIAGNOSIS — J18.1 RIGHT UPPER LOBE CONSOLIDATION (HCC): Primary | ICD-10-CM

## 2021-08-05 DIAGNOSIS — E55.9 VITAMIN D DEFICIENCY: ICD-10-CM

## 2021-08-05 DIAGNOSIS — F43.10 PTSD (POST-TRAUMATIC STRESS DISORDER): ICD-10-CM

## 2021-08-05 DIAGNOSIS — M25.50 ARTHRALGIA OF MULTIPLE JOINTS: ICD-10-CM

## 2021-08-05 DIAGNOSIS — Z95.0 PACEMAKER: ICD-10-CM

## 2021-08-05 PROCEDURE — 99214 OFFICE O/P EST MOD 30 MIN: CPT | Performed by: NURSE PRACTITIONER

## 2021-08-05 PROCEDURE — 1036F TOBACCO NON-USER: CPT | Performed by: NURSE PRACTITIONER

## 2021-08-05 PROCEDURE — G8427 DOCREV CUR MEDS BY ELIG CLIN: HCPCS | Performed by: NURSE PRACTITIONER

## 2021-08-05 PROCEDURE — 3017F COLORECTAL CA SCREEN DOC REV: CPT | Performed by: NURSE PRACTITIONER

## 2021-08-05 PROCEDURE — G8417 CALC BMI ABV UP PARAM F/U: HCPCS | Performed by: NURSE PRACTITIONER

## 2021-08-05 RX ORDER — ALPRAZOLAM 0.5 MG/1
TABLET ORAL
Qty: 60 TABLET | Refills: 1 | Status: SHIPPED | OUTPATIENT
Start: 2021-08-05 | End: 2021-11-08 | Stop reason: SDUPTHER

## 2021-08-05 ASSESSMENT — ENCOUNTER SYMPTOMS
SHORTNESS OF BREATH: 0
NAUSEA: 0
BACK PAIN: 1
COUGH: 1
ABDOMINAL PAIN: 0

## 2021-08-05 NOTE — PROGRESS NOTES
Subjective:      Patient ID: Queenie Puckett 1960 is a 64 y.o. female here for evaluation. 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 Arnaldo Ca - Dr. Jessika Hooper    Following with [facility] and Thoracic SURGURY regarding abnormal CT and PET SCAN. Will be having Lobectomy related to right upper nodule concerning for malignancy at end of August.     [facility] PET SCAN  IMPRESSION   IMPRESSION: Hypermetabolic right upper lobe nodule, concerning for malignancy. No definite other suspicious hypermetabolic foci or lymphadenopathy   identified. CT Scan 4/14/21  IMPRESSION:     1.   Suspicious spiculated right upper lobe nodule measuring up to 1.3 cm. Surrounding groundglass is nonspecific and may represent lepidic growth, focal   airspace disease or hemorrhage. PET/CT or tissue sampling is recommended as   well as comparison to the prior imaging given history of right upper lobe mass   in 2019 indicated under given clinical history. 2.  No adenopathy. Hx of PACER in late 90s. Seen CARDIO in mid 2010s per notes that pacer was non-operational.  Heart was pacing without. Pacer was left due no issues despite non-functional status   SEEN CARDIO with no concern and leaving pacer in despite non-functional status. .     High anxiety and PTSD. Hx of schizoaffective disorder.   On Prozac 20 mg dizziness, light-headedness and headaches. Psychiatric/Behavioral: Positive for sleep disturbance. The patient is nervous/anxious. Objective:   Physical Exam  Constitutional:       General: She is not in acute distress. Eyes:      Pupils: Pupils are equal, round, and reactive to light. Cardiovascular:      Rate and Rhythm: Normal rate and regular rhythm. Heart sounds: No murmur heard. Pulmonary:      Effort: Pulmonary effort is normal.      Breath sounds: Normal breath sounds. No wheezing. Abdominal:      General: Bowel sounds are normal. There is no distension. Palpations: Abdomen is soft. Tenderness: There is no abdominal tenderness. Musculoskeletal:      Cervical back: Normal range of motion and neck supple. Lumbar back: Decreased range of motion. Right knee: Decreased range of motion. Tenderness present. Skin:     General: Skin is warm and dry. Findings: No rash. Assessment:       Diagnosis Orders   1. Right upper lobe consolidation ([address]     2. PTSD (post-traumatic stress disorder)  ALPRAZolam (XANAX) 0.5 MG tablet   3. Coughing up blood     4. Generalized anxiety disorder     5. Vitamin D deficiency     6. Pacemaker     7. Arthralgia of multiple joints     8. Systemic lupus erythematosus (SLE) with pericarditis, unspecified SLE type ([address]             Plan:      Chronic conditions stable  Labs and Imaging reviewed from [address] [place] [address]  Follow up with Specialists - PULM, RHEUM, CARDIO  Refills as above  Labs as above  Denied Immunizations and MAMMO  RTO in 6 months                  Current Outpatient Medications   Medication Sig Dispense Refill    ALPRAZolam (XANAX) 0.5 MG tablet take 1 tablet by mouth twice a day 60 tablet 1    Handicap Placard MISC by Does not apply route Expires 7/2026 1 each 0    acetaminophen (TYLENOL) 500 MG tablet Take 1-2 tablets by mouth every 8 hours as needed for Pain Maximum dose- 8 tablets/24 hours.  120 tablet 3    meloxicam

## 2021-08-31 ENCOUNTER — NURSE ONLY (OUTPATIENT)
Dept: LAB | Age: 61
End: 2021-08-31

## 2021-09-04 LAB
CHLAMYDIA TRACHOMATIS AMPLIFIED DET: NEGATIVE
NEISSERIA GONORRHOEAE BY AMP: NEGATIVE
SPECIMEN SOURCE: NORMAL

## 2021-09-06 LAB
APTIMA MEDIA TYPE: NORMAL
T. VAGINALIS SPECIMEN SOURCE: NORMAL
TRICHOMONAS VAGINALIS BY NAA: NEGATIVE

## 2021-09-14 ENCOUNTER — TELEPHONE (OUTPATIENT)
Dept: FAMILY MEDICINE CLINIC | Age: 61
End: 2021-09-14

## 2021-09-14 DIAGNOSIS — L24.9 IRRITANT DERMATITIS: Primary | ICD-10-CM

## 2021-09-14 RX ORDER — TRIAMCINOLONE ACETONIDE 1 MG/G
CREAM TOPICAL
Qty: 45 G | Refills: 0 | Status: SHIPPED | OUTPATIENT
Start: 2021-09-14 | End: 2021-12-01

## 2021-09-14 NOTE — TELEPHONE ENCOUNTER
The patient called and stated that she has a burn/rash on the back of her neck. She thinks it is from a hair product. She states that it is brown, dry and flaky and will itch at times. She is asking for a cream to be sent to [facility]. If no call back the she will check with her pharmacy after 4pm today.

## 2021-09-17 NOTE — PROGRESS NOTES
[facility]   Date Of Service: 2021  Provider: Jessica Hedrick DO, DO  Name: Natalya Dong   MRN: [id number]    Chief Complaint(s)      Chief Complaint   Patient presents with    New Patient      History of Present illness (HPI)    Queenie Puckett   is a(n)60 y.o. female with a hx of  has a past medical history of Anxiety, asthma, latesha w/ CPAP,  Depression, PTSD (post-traumatic stress disorder), and Schizoaffective disorder ([facility] ,h/o Systemic lupus , 3 degree heart block s/p dual chamber ppm (81). H/o pulmonary nodule (eval at [facility] 2021) , H/o hemoptysis in 2017 with intermittent peisodes. referred by ESTHER Mao -* for evaluation of serositis w/ pericarditis. Patient status post delivery of child at 14years of age with  labor at 5-2/4 months. Reportedly diagnosed with systemic lupus around 18years of age after presenting with blisters on her hands along with redness around her mouth as well at the time. She was difficulty breathing. Admitted to [facility] for approximately 3 and half months. Reported development of convulsions or seizures. Reports intubation  . Cardiac arrest x 3 during the hospital course - external pacemaker ws not sufficent so had permenent pace placed. and pacemaker placement for 3rd degree heart block. Pneumonia \"fluid on the lungs\"  at that time Treated w/ prednisone + cytoxan x 1 month in hospital. + weight gain w/ prednisone. Reports having  Oral Cytoxan for the  Systemic lupus. Received for about 2 years (? 19 to [age] y.o.a) Denies other medication treatment. No similar sx's since this time. Other rheumatoogy evaluation at [facility] in , and . Denies other treatment for systemic lupus or other CTD - eg. Plaquenil or other DMARDs.   Patient reports having intermittent prednisone or medrol dose packs from  to  at [place]    Prior injury in the s with baseball back to the  left knee and left a past medical history of Anxiety, Depression, PTSD (post-traumatic stress disorder), and Schizoaffective disorder ([facility] PAST SURGICAL HISTORY     has a past surgical history that includes Foot surgery (Right) and Pacemaker insertion (1981). FAMILY HISTORY    No family history on file. SOCIAL HISTORY     reports that she quit smoking about 12 years ago. Her smoking use included cigarettes. She has a 7.50 pack-year smoking history. She has never used smokeless tobacco. She reports previous drug use. Drug: Marijuana. She reports that she does not drink alcohol. ALLERGIES     Allergies   Allergen Reactions    Naproxen Other (See Comments)     ears ring       CURRENT MEDICATIONS      Current Outpatient Medications:     triamcinolone (KENALOG) 0.1 % cream, Apply topically 2 times daily. , Disp: 45 g, Rfl: 0    Handicap Placard MISC, by Does not apply route Expires 7/2026, Disp: 1 each, Rfl: 0    acetaminophen (TYLENOL) 500 MG tablet, Take 1-2 tablets by mouth every 8 hours as needed for Pain Maximum dose- 8 tablets/24 hours. , Disp: 120 tablet, Rfl: 3    meloxicam (MOBIC) 15 MG tablet, Take 1 tablet by mouth daily, Disp: 30 tablet, Rfl: 3    doxepin (SINEQUAN) 50 MG capsule, Take 1 capsule by mouth nightly, Disp: 30 capsule, Rfl: 11    FLUoxetine (PROZAC) 20 MG capsule, , Disp: , Rfl:     rosuvastatin (CRESTOR) 10 MG tablet, take 1 tablet by mouth at bedtime, Disp: , Rfl:     predniSONE (DELTASONE) 20 MG tablet, Take 20 mg by mouth 2 times daily, Disp: , Rfl:     famotidine (PEPCID) 40 MG tablet, Take 40 mg by mouth daily, Disp: , Rfl:     blood glucose test strips (FREESTYLE LITE) strip, 1 each by In Vitro route daily As needed  Freestyle Lite, Disp: , Rfl:     PHYSICAL EXAMINATION / OBJECTIVE     Objective:  /84 (Site: Left Upper Arm, Position: Sitting, Cuff Size: Medium Adult)   Pulse 72   Ht 5' 5.98\" (1.676 m)   Wt 202 lb (91.6 kg)   SpO2 98%   BMI 32.62 kg/m²     General Appearance:   alert and oriented to person, place and time well-developed and well nourished  Physch : appropriate affects ,   Head:  Normocephalic and atraumatic  Eyes: No gross abnormalities. , PERRL, Sclera nonicteric, conjunctiva non-INJECTED  ENT:  MMM,  no deformities , NO oral/nasal sores, Non-tender sinuses. Neck:  Neck supple, Non-tender, No  mass, thyromegaly,    Lymph:  No cervical  or  supraclavicular lymph node swelling. Pulmonary/Chest:  CTA bilat. , normal air movement, no respiratory distress  Cardiovascular:  Normal rate, + S1 and S2,  NO murmurs , rubs, gallups,       :  Deferred   Abd/GI: Deferred   Neurologic:  gait and coordination normal and speech normal  Skin:  Skin color and temp ,  No rashes or lesions  Extremities:  No clubbing ,     Musculoskeletal:  Upper extremities:   SHOULDERS  ,  ELBOWS ,  WRISTS  --- + tinel left elbow.  , HANDS/FINGERS - cmc squaring (left), CMC grind bilat) , right 5th finger flexion tendon. Lower extremities: HIPS , KNEES , ANKLES ,  FEET : Non-tender, No swelling      Spine:  C-spine, T-spine & L-spine:  Non-tender. KEY:  Tender :  T  Swelling: S  Non-tender : NT  No swelling: NS           RAPID 3  -- Composite Score MDHAQ (0-10) + Patient pain VAS (0-10): + Patient global assessment VAS (0-10):     9/17/2021 --- RAPID 3 :  +  +  =     Remission: <3  Low Disease Activity: <6  Moderate Disease Activity: >=6 and <=12  High Disease Activity: >12    LABS        CBC  Lab Results   Component Value Date    WBC 4.3 03/03/2021    RBC 4.56 03/03/2021    HGB 12.1 03/03/2021    HCT 38.4 03/03/2021    MCV 84.2 03/03/2021    MCH 26.5 03/03/2021    MCHC 31.5 03/03/2021     03/03/2021       CMP  Lab Results   Component Value Date    BUN 12 03/03/2021    CREATININE 0.7 03/03/2021       HgBA1c: No components found for: HGBA1C    No results found for: TSHFT4, TSH  No results found for: VITD25      Lab Results   Component Value Date    ANASCRN None Detected Metabolic Panel; Future  - Sedimentation Rate; Future  - C-Reactive Protein; Future    2. De Quervain's disease (radial styloid tenosynovitis) - ? If related to #1 or overuse. - SPLINT THUMB SPICA    3. Localized primary osteoarthritis of carpometacarpal (CMC) joint, unspecified laterality  - SPLINT THUMB SPICA        Return in about 4 months (around 1/20/2022). Electronically signed by Nahun Stone DO on 9/17/2021 at 7:13 AM    New Prescriptions    No medications on file       9/17/2021       The risks and benefits of my recommendations, as well as other treatment options, benefits and side effects were discussed with the patient today. Questions were answered. Thank you for allowing me to participate in the care of this patient. Please call if there are any questions.

## 2021-09-20 ENCOUNTER — OFFICE VISIT (OUTPATIENT)
Dept: RHEUMATOLOGY | Age: 61
End: 2021-09-20
Payer: COMMERCIAL

## 2021-09-20 VITALS
DIASTOLIC BLOOD PRESSURE: 84 MMHG | WEIGHT: 202 LBS | HEIGHT: 66 IN | BODY MASS INDEX: 32.47 KG/M2 | HEART RATE: 72 BPM | OXYGEN SATURATION: 98 % | SYSTOLIC BLOOD PRESSURE: 122 MMHG

## 2021-09-20 DIAGNOSIS — M65.4 DE QUERVAIN'S DISEASE (RADIAL STYLOID TENOSYNOVITIS): ICD-10-CM

## 2021-09-20 DIAGNOSIS — M19.049 LOCALIZED PRIMARY OSTEOARTHRITIS OF CARPOMETACARPAL (CMC) JOINT, UNSPECIFIED LATERALITY: ICD-10-CM

## 2021-09-20 DIAGNOSIS — M32.9 H/O SYSTEMIC LUPUS ERYTHEMATOSUS (SLE) (HCC): Primary | ICD-10-CM

## 2021-09-20 PROCEDURE — 1036F TOBACCO NON-USER: CPT | Performed by: INTERNAL MEDICINE

## 2021-09-20 PROCEDURE — 99214 OFFICE O/P EST MOD 30 MIN: CPT | Performed by: INTERNAL MEDICINE

## 2021-09-20 PROCEDURE — G8427 DOCREV CUR MEDS BY ELIG CLIN: HCPCS | Performed by: INTERNAL MEDICINE

## 2021-09-20 PROCEDURE — 3017F COLORECTAL CA SCREEN DOC REV: CPT | Performed by: INTERNAL MEDICINE

## 2021-09-20 PROCEDURE — G8417 CALC BMI ABV UP PARAM F/U: HCPCS | Performed by: INTERNAL MEDICINE

## 2021-09-20 ASSESSMENT — ENCOUNTER SYMPTOMS
NAUSEA: 0
EYES NEGATIVE: 1
WHEEZING: 0
COLOR CHANGE: 1
COUGH: 0
EYE PAIN: 0
VOMITING: 0
SHORTNESS OF BREATH: 0
DIARRHEA: 0
EYE REDNESS: 0
CONSTIPATION: 0
EYE ITCHING: 0

## 2021-10-02 ENCOUNTER — HOSPITAL ENCOUNTER (OUTPATIENT)
Age: 61
Discharge: HOME | End: 2021-10-02
Payer: COMMERCIAL

## 2021-10-02 DIAGNOSIS — M32.9 H/O SYSTEMIC LUPUS ERYTHEMATOSUS (SLE) (HCC): ICD-10-CM

## 2021-10-02 LAB
ALBUMIN SERPL-MCNC: 4.4 G/DL (ref 3.5–5.1)
ALP BLD-CCNC: 73 U/L (ref 38–126)
ALT SERPL-CCNC: 6 U/L (ref 11–66)
ANION GAP SERPL CALCULATED.3IONS-SCNC: 11 MEQ/L (ref 8–16)
AST SERPL-CCNC: 16 U/L (ref 5–40)
BASOPHILS # BLD: 0.9 %
BASOPHILS ABSOLUTE: 0.1 THOU/MM3 (ref 0–0.1)
BILIRUB SERPL-MCNC: 0.4 MG/DL (ref 0.3–1.2)
BUN BLDV-MCNC: 10 MG/DL (ref 7–22)
C-REACTIVE PROTEIN: 3.65 MG/DL (ref 0–1)
CALCIUM SERPL-MCNC: 9.5 MG/DL (ref 8.5–10.5)
CHLORIDE BLD-SCNC: 99 MEQ/L (ref 98–111)
CO2: 30 MEQ/L (ref 23–33)
CREAT SERPL-MCNC: 0.9 MG/DL (ref 0.4–1.2)
EOSINOPHIL # BLD: 3.7 %
EOSINOPHILS ABSOLUTE: 0.2 THOU/MM3 (ref 0–0.4)
ERYTHROCYTE [DISTWIDTH] IN BLOOD BY AUTOMATED COUNT: 14.6 % (ref 11.5–14.5)
ERYTHROCYTE [DISTWIDTH] IN BLOOD BY AUTOMATED COUNT: 44.3 FL (ref 35–45)
GFR SERPL CREATININE-BSD FRML MDRD: 78 ML/MIN/1.73M2
GLUCOSE BLD-MCNC: 113 MG/DL (ref 70–108)
HCT VFR BLD CALC: 40.7 % (ref 37–47)
HEMOGLOBIN: 13.3 GM/DL (ref 12–16)
HEPATITIS B SURFACE ANTIGEN: NEGATIVE
HEPATITIS C ANTIBODY: NEGATIVE
IMMATURE GRANS (ABS): 0.01 THOU/MM3 (ref 0–0.07)
IMMATURE GRANULOCYTES: 0.2 %
LYMPHOCYTES # BLD: 28 %
LYMPHOCYTES ABSOLUTE: 1.6 THOU/MM3 (ref 1–4.8)
MCH RBC QN AUTO: 27.4 PG (ref 26–33)
MCHC RBC AUTO-ENTMCNC: 32.7 GM/DL (ref 32.2–35.5)
MCV RBC AUTO: 83.9 FL (ref 81–99)
MONOCYTES # BLD: 7.2 %
MONOCYTES ABSOLUTE: 0.4 THOU/MM3 (ref 0.4–1.3)
NUCLEATED RED BLOOD CELLS: 0 /100 WBC
PLATELET # BLD: 313 THOU/MM3 (ref 130–400)
PMV BLD AUTO: 9.1 FL (ref 9.4–12.4)
POTASSIUM SERPL-SCNC: 4.2 MEQ/L (ref 3.5–5.2)
RBC # BLD: 4.85 MILL/MM3 (ref 4.2–5.4)
RPR: NONREACTIVE
SEDIMENTATION RATE, ERYTHROCYTE: 26 MM/HR (ref 0–20)
SEG NEUTROPHILS: 60 %
SEGMENTED NEUTROPHILS ABSOLUTE COUNT: 3.4 THOU/MM3 (ref 1.8–7.7)
SODIUM BLD-SCNC: 140 MEQ/L (ref 135–145)
TOTAL PROTEIN: 7.5 G/DL (ref 6.1–8)
WBC # BLD: 5.7 THOU/MM3 (ref 4.8–10.8)

## 2021-10-02 PROCEDURE — 87340 HEPATITIS B SURFACE AG IA: CPT

## 2021-10-02 PROCEDURE — 86140 C-REACTIVE PROTEIN: CPT

## 2021-10-02 PROCEDURE — 87389 HIV-1 AG W/HIV-1&-2 AB AG IA: CPT

## 2021-10-02 PROCEDURE — 85025 COMPLETE CBC W/AUTO DIFF WBC: CPT

## 2021-10-02 PROCEDURE — 36415 COLL VENOUS BLD VENIPUNCTURE: CPT

## 2021-10-02 PROCEDURE — 86803 HEPATITIS C AB TEST: CPT

## 2021-10-02 PROCEDURE — 85651 RBC SED RATE NONAUTOMATED: CPT

## 2021-10-02 PROCEDURE — 86592 SYPHILIS TEST NON-TREP QUAL: CPT

## 2021-10-02 PROCEDURE — 80053 COMPREHEN METABOLIC PANEL: CPT

## 2021-10-03 LAB — HIV AG/AB: NONREACTIVE

## 2021-10-04 ENCOUNTER — TELEPHONE (OUTPATIENT)
Dept: RHEUMATOLOGY | Age: 61
End: 2021-10-04

## 2021-10-04 NOTE — TELEPHONE ENCOUNTER
Phoned her and informed that this is needed to go medical supply not pharmacy.  she voiced understanding

## 2021-11-08 DIAGNOSIS — F43.10 PTSD (POST-TRAUMATIC STRESS DISORDER): ICD-10-CM

## 2021-11-08 RX ORDER — ALPRAZOLAM 0.5 MG/1
TABLET ORAL
Qty: 60 TABLET | Refills: 1 | Status: SHIPPED | OUTPATIENT
Start: 2021-11-08 | End: 2022-02-10 | Stop reason: SDUPTHER

## 2021-11-08 NOTE — TELEPHONE ENCOUNTER
Requested Prescriptions     Pending Prescriptions Disp Refills    ALPRAZolam (XANAX) 0.5 MG tablet 60 tablet 1     Sig: take 1 tablet by mouth twice a day       If no call back patient will check with MULU Ann at 12 noon today.

## 2021-12-01 ENCOUNTER — OFFICE VISIT (OUTPATIENT)
Dept: FAMILY MEDICINE CLINIC | Age: 61
End: 2021-12-01
Payer: COMMERCIAL

## 2021-12-01 VITALS
BODY MASS INDEX: 31.09 KG/M2 | WEIGHT: 192.5 LBS | SYSTOLIC BLOOD PRESSURE: 118 MMHG | RESPIRATION RATE: 16 BRPM | HEART RATE: 72 BPM | DIASTOLIC BLOOD PRESSURE: 68 MMHG

## 2021-12-01 DIAGNOSIS — M32.12 SYSTEMIC LUPUS ERYTHEMATOSUS (SLE) WITH PERICARDITIS, UNSPECIFIED SLE TYPE (HCC): ICD-10-CM

## 2021-12-01 DIAGNOSIS — M79.2 THORACIC NEURALGIA: Primary | ICD-10-CM

## 2021-12-01 DIAGNOSIS — B44.9 INTRACAVITARY ASPERGILLUS FUNGUS BALL (HCC): ICD-10-CM

## 2021-12-01 DIAGNOSIS — F43.10 PTSD (POST-TRAUMATIC STRESS DISORDER): ICD-10-CM

## 2021-12-01 DIAGNOSIS — Z90.2 S/P LOBECTOMY OF LUNG: ICD-10-CM

## 2021-12-01 DIAGNOSIS — F41.1 GENERALIZED ANXIETY DISORDER: ICD-10-CM

## 2021-12-01 DIAGNOSIS — E66.09 CLASS 1 OBESITY DUE TO EXCESS CALORIES WITHOUT SERIOUS COMORBIDITY WITH BODY MASS INDEX (BMI) OF 31.0 TO 31.9 IN ADULT: ICD-10-CM

## 2021-12-01 DIAGNOSIS — M25.50 ARTHRALGIA OF MULTIPLE JOINTS: ICD-10-CM

## 2021-12-01 PROCEDURE — 99214 OFFICE O/P EST MOD 30 MIN: CPT | Performed by: NURSE PRACTITIONER

## 2021-12-01 PROCEDURE — G8484 FLU IMMUNIZE NO ADMIN: HCPCS | Performed by: NURSE PRACTITIONER

## 2021-12-01 PROCEDURE — 3017F COLORECTAL CA SCREEN DOC REV: CPT | Performed by: NURSE PRACTITIONER

## 2021-12-01 PROCEDURE — G8427 DOCREV CUR MEDS BY ELIG CLIN: HCPCS | Performed by: NURSE PRACTITIONER

## 2021-12-01 PROCEDURE — 1036F TOBACCO NON-USER: CPT | Performed by: NURSE PRACTITIONER

## 2021-12-01 PROCEDURE — G8417 CALC BMI ABV UP PARAM F/U: HCPCS | Performed by: NURSE PRACTITIONER

## 2021-12-01 RX ORDER — OXYCODONE HYDROCHLORIDE 5 MG/1
5 TABLET ORAL EVERY 8 HOURS PRN
COMMUNITY
Start: 2021-11-15 | End: 2021-12-01

## 2021-12-01 RX ORDER — GABAPENTIN 300 MG/1
300 CAPSULE ORAL 2 TIMES DAILY
Qty: 180 CAPSULE | Refills: 1 | Status: SHIPPED | OUTPATIENT
Start: 2021-12-01 | End: 2022-03-07

## 2021-12-01 RX ORDER — IPRATROPIUM BROMIDE AND ALBUTEROL SULFATE 2.5; .5 MG/3ML; MG/3ML
3 SOLUTION RESPIRATORY (INHALATION) EVERY 6 HOURS PRN
COMMUNITY
Start: 2021-10-10 | End: 2022-07-13

## 2021-12-01 RX ORDER — FLUOXETINE HYDROCHLORIDE 20 MG/1
20 CAPSULE ORAL DAILY
Qty: 90 CAPSULE | Refills: 3 | Status: SHIPPED | OUTPATIENT
Start: 2021-12-01 | End: 2022-07-13

## 2021-12-01 ASSESSMENT — ENCOUNTER SYMPTOMS
COUGH: 1
BACK PAIN: 1
SHORTNESS OF BREATH: 0
ABDOMINAL PAIN: 0
NAUSEA: 0

## 2021-12-01 ASSESSMENT — PATIENT HEALTH QUESTIONNAIRE - PHQ9
SUM OF ALL RESPONSES TO PHQ QUESTIONS 1-9: 0
2. FEELING DOWN, DEPRESSED OR HOPELESS: 0
SUM OF ALL RESPONSES TO PHQ QUESTIONS 1-9: 0
1. LITTLE INTEREST OR PLEASURE IN DOING THINGS: 0
SUM OF ALL RESPONSES TO PHQ QUESTIONS 1-9: 0
SUM OF ALL RESPONSES TO PHQ9 QUESTIONS 1 & 2: 0

## 2021-12-01 NOTE — PROGRESS NOTES
Subjective:      Patient ID: Queenie Puckett 1960 is a 63 y.o. female here for evaluation. 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 Frandy Macias - Dr. Maykel Saldivar - Dr. Peri Gunn    Was following with Mendy Forrester and Thoracic SURGURY regarding abnormal CT and PET SCAN. 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 No additional treatment. No biopsy prior. Continues with pain at site of surgery. Was on oxycodone 5 mg. Continues in pain with DB, cough, sneezing. No longer following with Thoracic Surgery. Hx of PACER in late 90s. Seen CARDIO in mid 2010s per notes that pacer was non-operational.  Heart was pacing without. Pacer was left due no issues despite non-functional status   SEEN CARDIO with no concern and leaving pacer in despite non-functional status. .     High anxiety and PTSD. Hx of schizoaffective disorder. On Prozac 20 mg and Xanax BID    Right knee arthritis. Hx of joint INJ every 6 months. Was also following with a Back Doctor and PAIN DOCTOR.    Following with RHEUM at [address]:    12/01/21 1426   BP: 118/68 Pulse: 72   Resp: 16       Labs reviewed. No results found for: LABA1C  No results found for: EAG    No components found for: CHLPL  No results found for: TRIG  No results found for: HDL  No results found for: LDLCALC  No results found for: LABVLDL      Chemistry        Component Value Date/Time     10/02/2021 1140    K 4.2 10/02/2021 1140    CL 99 10/02/2021 1140    CO2 30 10/02/2021 1140    BUN 10 10/02/2021 1140    CREATININE 0.9 10/02/2021 1140        Component Value Date/Time    CALCIUM 9.5 10/02/2021 1140    ALKPHOS 73 10/02/2021 1140    AST 16 10/02/2021 1140    ALT 6 (L) 10/02/2021 1140    BILITOT 0.4 10/02/2021 1140            No results found for: TSH, N9UYAFM, L2LJKYA, THYROIDAB    Lab Results   Component Value Date    WBC 5.7 10/02/2021    HGB 13.3 10/02/2021    HCT 40.7 10/02/2021    MCV 83.9 10/02/2021     10/02/2021     Health Maintenance   Topic Date Due    Pneumococcal 0-64 years Vaccine (1 of 4 - PCV13) Never done    DTaP/Tdap/Td vaccine (1 - Tdap) Never done    Cervical cancer screen  Never done    Diabetes screen  Never done    Colon cancer screen colonoscopy  Never done    Breast cancer screen  Never done    COVID-19 Vaccine (3 - Moderna risk 4-dose series) 04/23/2021    Flu vaccine (1) Never done    Lipid screen  02/04/2026    Hepatitis C screen  Completed    HIV screen  Completed    Hepatitis A vaccine  Aged Out    Hepatitis B vaccine  Aged Out    Hib vaccine  Aged Out    Meningococcal (ACWY) vaccine  Aged [employer] History   Administered Date(s) Administered    COVID-19, Mauri Ekwok, Primary or Immunocompromised, PF, 100mcg/0.5mL 02/26/2021, 03/26/2021       Review of Systems   Constitutional: Negative for chills and fever. HENT: Negative. Respiratory: Positive for cough. Negative for shortness of breath. Cardiovascular: Negative for chest pain. Gastrointestinal: Negative for abdominal pain and nausea.    Musculoskeletal: Positive for arthralgias and back pain. Skin: Negative for rash. Neurological: Negative for dizziness, light-headedness and headaches. Psychiatric/Behavioral: Positive for sleep disturbance. The patient is nervous/anxious. Objective:   Physical Exam  Constitutional:       General: She is not in acute distress. Eyes:      Pupils: Pupils are equal, round, and reactive to light. Cardiovascular:      Rate and Rhythm: Normal rate and regular rhythm. Heart sounds: No murmur heard. Pulmonary:      Effort: Pulmonary effort is normal.      Breath sounds: Normal breath sounds. No wheezing. Abdominal:      General: Bowel sounds are normal. There is no distension. Palpations: Abdomen is soft. Tenderness: There is no abdominal tenderness. Musculoskeletal:      Cervical back: Normal range of motion and neck supple. Lumbar back: Decreased range of motion. Right knee: Decreased range of motion. Tenderness present. Skin:     General: Skin is warm and dry. Findings: No rash. Assessment:       Diagnosis Orders   1. Thoracic neuralgia  gabapentin (NEURONTIN) 300 MG capsule   2. Intracavitary aspergillus fungus ball ([address]     3. S/P lobectomy of lung     4. PTSD (post-traumatic stress disorder)  FLUoxetine (PROZAC) 20 MG capsule   5. Generalized anxiety disorder     6. Class 1 obesity due to excess calories without serious comorbidity with body mass index (BMI) of 31.0 to 31.9 in adult  CBC    Lipid Panel    Comprehensive Metabolic Panel    Hemoglobin A1C    TSH with Reflex   7. Arthralgia of multiple joints     8.  Systemic lupus erythematosus (SLE) with pericarditis, unspecified SLE type ([address]             Plan:      Chronic conditions stable  Labs and Imaging reviewed from [facility]  Refill as above   - start Gabapentin 300 mg BID   - restart Prozac 20 mg  Follow up with Specialists - PULM, RHEUM, CARDIO  Refills as above  Labs as above  Denied Immunizations and MAMMO  RTO in 3

## 2021-12-02 PROBLEM — B44.9 INTRACAVITARY ASPERGILLUS FUNGUS BALL (HCC): Status: ACTIVE | Noted: 2021-12-02

## 2021-12-02 PROBLEM — Z90.2 S/P LOBECTOMY OF LUNG: Status: ACTIVE | Noted: 2021-12-02

## 2021-12-11 ENCOUNTER — TELEPHONE (OUTPATIENT)
Dept: FAMILY MEDICINE CLINIC | Age: 61
End: 2021-12-11

## 2021-12-11 DIAGNOSIS — B96.89 BV (BACTERIAL VAGINOSIS): Primary | ICD-10-CM

## 2021-12-11 DIAGNOSIS — N76.0 BV (BACTERIAL VAGINOSIS): Primary | ICD-10-CM

## 2021-12-11 NOTE — TELEPHONE ENCOUNTER
----- Message from Torie Speak sent at 12/10/2021  2:34 PM EST -----  Subject: Refill Request    QUESTIONS  Name of Medication? Other - Metronidazole mg 500  Patient-reported dosage and instructions? one tab by mouth every 12hrs for   7 days  How many days do you have left? 0  Preferred Pharmacy? [address] [address] phone number (if available)? [phone]  Additional Information for Provider? Patient hoping she can get put on   this before she is able to get appointment with the referred OBGYN. Patient doesnt have appointment yet with OBGYN.  ---------------------------------------------------------------------------  --------------  CALL BACK INFO  What is the best way for the office to contact you? OK to leave message on   voicemail  Preferred Call Back Phone Number?  [phone]

## 2021-12-12 RX ORDER — METRONIDAZOLE 500 MG/1
500 TABLET ORAL 2 TIMES DAILY
Qty: 14 TABLET | Refills: 0 | Status: SHIPPED | OUTPATIENT
Start: 2021-12-12 | End: 2021-12-19

## 2022-02-10 ENCOUNTER — TELEPHONE (OUTPATIENT)
Dept: FAMILY MEDICINE CLINIC | Age: 62
End: 2022-02-10

## 2022-02-10 DIAGNOSIS — F43.10 PTSD (POST-TRAUMATIC STRESS DISORDER): ICD-10-CM

## 2022-02-10 RX ORDER — ALPRAZOLAM 0.5 MG/1
TABLET ORAL
Qty: 60 TABLET | Refills: 2 | Status: SHIPPED | OUTPATIENT
Start: 2022-02-10 | End: 2022-05-27

## 2022-02-22 LAB
ABSOLUTE BASO #: 0.1 X10E9/L (ref 0–0.2)
ABSOLUTE EOS #: 0.1 X10E9/L (ref 0–0.4)
ABSOLUTE LYMPH #: 2 X10E9/L (ref 1–3.5)
ABSOLUTE MONO #: 0.3 X10E9/L (ref 0–0.9)
ABSOLUTE NEUT #: 2.4 X10E9/L (ref 1.5–6.6)
ALBUMIN SERPL-MCNC: 4.1 G/DL (ref 3.2–5.3)
ALK PHOSPHATASE: 60 U/L (ref 39–130)
ALT SERPL-CCNC: 11 U/L (ref 0–31)
ANION GAP SERPL CALCULATED.3IONS-SCNC: 8 MMOL/L (ref 5–15)
AST SERPL-CCNC: 21 U/L (ref 0–41)
AVERAGE GLUCOSE: 146 MG/DL
BASOPHILS RELATIVE PERCENT: 1.8 %
BILIRUB SERPL-MCNC: 0.6 MG/DL (ref 0.3–1.2)
BUN BLDV-MCNC: 11 MG/DL (ref 5–23)
CALCIUM SERPL-MCNC: 9.4 MG/DL (ref 8.5–10.5)
CHLORIDE BLD-SCNC: 104 MMOL/L (ref 98–109)
CHOLESTEROL/HDL RATIO: 3.5 (ref 1–5)
CHOLESTEROL: 220 MG/DL (ref 150–200)
CO2: 29 MMOL/L (ref 22–32)
CREAT SERPL-MCNC: 0.9 MG/DL (ref 0.4–1)
EGFR AFRICAN AMERICAN: >60 ML/MIN/1.73SQ.M
EGFR IF NONAFRICAN AMERICAN: >60 ML/MIN/1.73SQ.M
EOSINOPHILS RELATIVE PERCENT: 3 %
GLUCOSE: 99 MG/DL (ref 65–99)
HBA1C MFR BLD: 6.7 % (ref 4.4–6.4)
HCT VFR BLD CALC: 38.5 % (ref 35–47)
HDLC SERPL-MCNC: 62 MG/DL
HEMOGLOBIN: 12.6 G/DL (ref 11.7–15.5)
LDL CHOLESTEROL CALCULATED: 136 MG/DL
LDL/HDL RATIO: 2.2
LYMPHOCYTE %: 39.9 %
MCH RBC QN AUTO: 26.6 PG (ref 27–34)
MCHC RBC AUTO-ENTMCNC: 32.8 G/DL (ref 32–36)
MCV RBC AUTO: 81 FL (ref 80–100)
MONOCYTES # BLD: 6.3 %
NEUTROPHILS RELATIVE PERCENT: 49 %
PDW BLD-RTO: 15 % (ref 11.5–15)
PLATELETS: 316 X10E9/L (ref 150–450)
PMV BLD AUTO: 7.9 FL (ref 7–12)
POTASSIUM SERPL-SCNC: 4.3 MMOL/L (ref 3.5–5)
RBC: 4.75 X10E12/L (ref 3.8–5.2)
SODIUM BLD-SCNC: 141 MMOL/L (ref 134–146)
TOTAL PROTEIN: 7.5 G/DL (ref 6–8)
TRIGL SERPL-MCNC: 112 MG/DL (ref 27–150)
TSH SERPL DL<=0.05 MIU/L-ACNC: 0.92 UIU/ML (ref 0.49–4.67)
VLDLC SERPL CALC-MCNC: 22 MG/DL (ref 0–30)
WBC: 5 X10E9/L (ref 4–11)

## 2022-03-07 ENCOUNTER — OFFICE VISIT (OUTPATIENT)
Dept: FAMILY MEDICINE CLINIC | Age: 62
End: 2022-03-07
Payer: COMMERCIAL

## 2022-03-07 VITALS
BODY MASS INDEX: 34.07 KG/M2 | DIASTOLIC BLOOD PRESSURE: 84 MMHG | WEIGHT: 211 LBS | SYSTOLIC BLOOD PRESSURE: 116 MMHG | HEART RATE: 84 BPM | RESPIRATION RATE: 20 BRPM

## 2022-03-07 DIAGNOSIS — M17.11 ARTHRITIS OF RIGHT KNEE: ICD-10-CM

## 2022-03-07 DIAGNOSIS — E11.9 TYPE 2 DIABETES MELLITUS WITHOUT COMPLICATION, WITHOUT LONG-TERM CURRENT USE OF INSULIN (HCC): Primary | ICD-10-CM

## 2022-03-07 DIAGNOSIS — F43.10 PTSD (POST-TRAUMATIC STRESS DISORDER): ICD-10-CM

## 2022-03-07 DIAGNOSIS — M32.12 SYSTEMIC LUPUS ERYTHEMATOSUS (SLE) WITH PERICARDITIS, UNSPECIFIED SLE TYPE (HCC): ICD-10-CM

## 2022-03-07 DIAGNOSIS — M54.9 CHRONIC BACK PAIN, UNSPECIFIED BACK LOCATION, UNSPECIFIED BACK PAIN LATERALITY: ICD-10-CM

## 2022-03-07 DIAGNOSIS — B44.9 INTRACAVITARY ASPERGILLUS FUNGUS BALL (HCC): ICD-10-CM

## 2022-03-07 DIAGNOSIS — R05.9 COUGH: ICD-10-CM

## 2022-03-07 DIAGNOSIS — Z12.31 ENCOUNTER FOR SCREENING MAMMOGRAM FOR BREAST CANCER: ICD-10-CM

## 2022-03-07 DIAGNOSIS — F41.1 GENERALIZED ANXIETY DISORDER: ICD-10-CM

## 2022-03-07 DIAGNOSIS — M25.50 ARTHRALGIA OF MULTIPLE JOINTS: ICD-10-CM

## 2022-03-07 DIAGNOSIS — G89.29 CHRONIC BACK PAIN, UNSPECIFIED BACK LOCATION, UNSPECIFIED BACK PAIN LATERALITY: ICD-10-CM

## 2022-03-07 DIAGNOSIS — Z90.2 S/P LOBECTOMY OF LUNG: ICD-10-CM

## 2022-03-07 PROBLEM — G47.33 OSA ON CPAP: Status: ACTIVE | Noted: 2017-10-17

## 2022-03-07 PROBLEM — S29.012A STRAIN OF THORACIC BACK REGION: Status: ACTIVE | Noted: 2022-03-07

## 2022-03-07 PROBLEM — R91.8 LUNG MASS: Status: ACTIVE | Noted: 2021-04-14

## 2022-03-07 PROBLEM — E78.5 HYPERLIPIDEMIA: Status: ACTIVE | Noted: 2022-03-07

## 2022-03-07 PROBLEM — T14.8XXA CONTUSION: Status: ACTIVE | Noted: 2022-03-07

## 2022-03-07 PROBLEM — E66.9 SIMPLE OBESITY: Status: ACTIVE | Noted: 2020-07-02

## 2022-03-07 PROBLEM — F32.A DEPRESSION: Status: ACTIVE | Noted: 2017-10-10

## 2022-03-07 PROBLEM — R04.2 HEMOPTYSIS: Status: ACTIVE | Noted: 2021-04-14

## 2022-03-07 PROBLEM — D07.1 VIN III (VULVAR INTRAEPITHELIAL NEOPLASIA III): Status: ACTIVE | Noted: 2017-08-17

## 2022-03-07 PROBLEM — E66.811 CLASS 1 OBESITY: Status: ACTIVE | Noted: 2021-04-14

## 2022-03-07 PROBLEM — K21.9 GERD (GASTROESOPHAGEAL REFLUX DISEASE): Status: ACTIVE | Noted: 2021-10-07

## 2022-03-07 PROBLEM — S16.1XXA STRAIN OF NECK MUSCLE: Status: ACTIVE | Noted: 2022-03-07

## 2022-03-07 PROBLEM — Z99.89 OSA ON CPAP: Status: ACTIVE | Noted: 2017-10-17

## 2022-03-07 PROBLEM — E87.6 HYPOKALEMIA: Status: ACTIVE | Noted: 2022-03-07

## 2022-03-07 PROBLEM — E66.9 CLASS 1 OBESITY: Status: ACTIVE | Noted: 2021-04-14
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
## 2022-03-07 PROBLEM — G89.18 ACUTE POSTOPERATIVE PAIN: Status: ACTIVE | Noted: 2021-10-08

## 2022-03-07 PROBLEM — Z95.0 HISTORY OF CARDIAC PACEMAKER IN SITU: Status: ACTIVE | Noted: 2021-04-14

## 2022-03-07 PROBLEM — M32.9 SYSTEMIC LUPUS ERYTHEMATOSUS (HCC): Status: ACTIVE | Noted: 2021-04-15

## 2022-03-07 PROBLEM — R93.1 ECHOCARDIOGRAM ABNORMAL: Status: ACTIVE | Noted: 2021-04-14

## 2022-03-07 PROBLEM — I10 BENIGN ESSENTIAL HTN: Status: ACTIVE | Noted: 2017-10-17

## 2022-03-07 PROBLEM — I49.5 SICK SINUS SYNDROME (HCC): Status: ACTIVE | Noted: 2017-10-10

## 2022-03-07 PROBLEM — N87.1 CIN II (CERVICAL INTRAEPITHELIAL NEOPLASIA II): Status: ACTIVE | Noted: 2017-08-17

## 2022-03-07 PROBLEM — I44.2 AV BLOCK, 3RD DEGREE (HCC): Status: ACTIVE | Noted: 2017-10-17

## 2022-03-07 PROBLEM — B34.2 CORONAVIRUS INFECTION: Status: ACTIVE | Noted: 2020-07-02

## 2022-03-07 PROCEDURE — 3017F COLORECTAL CA SCREEN DOC REV: CPT | Performed by: NURSE PRACTITIONER

## 2022-03-07 PROCEDURE — 3044F HG A1C LEVEL LT 7.0%: CPT | Performed by: NURSE PRACTITIONER

## 2022-03-07 PROCEDURE — 2022F DILAT RTA XM EVC RTNOPTHY: CPT | Performed by: NURSE PRACTITIONER

## 2022-03-07 PROCEDURE — 1036F TOBACCO NON-USER: CPT | Performed by: NURSE PRACTITIONER

## 2022-03-07 PROCEDURE — 99214 OFFICE O/P EST MOD 30 MIN: CPT | Performed by: NURSE PRACTITIONER

## 2022-03-07 PROCEDURE — G8427 DOCREV CUR MEDS BY ELIG CLIN: HCPCS | Performed by: NURSE PRACTITIONER

## 2022-03-07 PROCEDURE — G8484 FLU IMMUNIZE NO ADMIN: HCPCS | Performed by: NURSE PRACTITIONER

## 2022-03-07 PROCEDURE — G8417 CALC BMI ABV UP PARAM F/U: HCPCS | Performed by: NURSE PRACTITIONER

## 2022-03-07 RX ORDER — AZITHROMYCIN 250 MG/1
TABLET, FILM COATED ORAL
Qty: 6 TABLET | Refills: 0 | Status: SHIPPED | OUTPATIENT
Start: 2022-03-07 | End: 2022-03-17

## 2022-03-07 RX ORDER — ROSUVASTATIN CALCIUM 10 MG/1
1 TABLET, COATED ORAL DAILY
COMMUNITY
Start: 2022-02-24 | End: 2022-07-13

## 2022-03-07 RX ORDER — ACETAMINOPHEN 500 MG
500-1000 TABLET ORAL EVERY 8 HOURS PRN
Qty: 120 TABLET | Refills: 3 | Status: SHIPPED | OUTPATIENT
Start: 2022-03-07 | End: 2022-09-19

## 2022-03-07 SDOH — ECONOMIC STABILITY: FOOD INSECURITY: WITHIN THE PAST 12 MONTHS, THE FOOD YOU BOUGHT JUST DIDN'T LAST AND YOU DIDN'T HAVE MONEY TO GET MORE.: NEVER TRUE

## 2022-03-07 SDOH — ECONOMIC STABILITY: FOOD INSECURITY: WITHIN THE PAST 12 MONTHS, YOU WORRIED THAT YOUR FOOD WOULD RUN OUT BEFORE YOU GOT MONEY TO BUY MORE.: NEVER TRUE

## 2022-03-07 ASSESSMENT — ENCOUNTER SYMPTOMS
ABDOMINAL PAIN: 0
NAUSEA: 0
BACK PAIN: 1
COUGH: 1
SHORTNESS OF BREATH: 0

## 2022-03-07 ASSESSMENT — SOCIAL DETERMINANTS OF HEALTH (SDOH): HOW HARD IS IT FOR YOU TO PAY FOR THE VERY BASICS LIKE FOOD, HOUSING, MEDICAL CARE, AND HEATING?: NOT HARD AT ALL

## 2022-03-07 NOTE — PATIENT INSTRUCTIONS
You may receive a survey regarding the care you received during your visit. Your input is valuable to us. We encourage you to complete and return your survey. We hope you will choose us in the future for your healthcare needs. Patient Education        Learning About Low-Carbohydrate Foods  What foods are low in carbohydrate? The foods you eat contain nutrients, such as vitamins and minerals. Carbohydrate is a nutrient. Your body needs the right amount to stay healthy and work as it should. You can use the list below to help you make choices about which foods to eat. Some foods that are lower in carbohydrate include:  Dairy and dairy alternatives  · Cheese  · Cottage cheese  · Cream cheese  · Nut milk (unsweetened)  · Soy milk (unsweetened)  · Yogurt (Greek, plain)  Fruits  · Avocado  · [employer] and other protein foods  · Almonds  · Beef  · Chicken  · Cod  · Eggs  · Halibut  · Peanut butter and other nut butters  · Pistachios  · Pork  · Pumpkin seeds  · Tofu  · Trout  · [facility]  · Citizen of [place] ([facility])  · Walnuts  Vegetables  · Broccoli  · Carrots  · Cauliflower  · Green beans  · Mushrooms  · Peppers  · Salad greens  · Spinach  · Tomatoes  Work with your doctor to find out how much of this nutrient you need. Depending on your health, you may need more or less of it in your diet. Where can you learn more? Go to [email or web address] and sign in to your Mee[employer] account. Enter [phone] in the [facility] box to learn more about \"Learning About Low-Carbohydrate Foods. \"     If you do not have an account, please click on the \"Sign Up Now\" link. Current as of: September 8, 2021               Content Version: 13.1  © 0656-1944 Healthwise, Incorporated. Care instructions adapted under license by [facility] ([facility]).  If you have questions about a medical condition or this instruction, always ask your healthcare professional. Trang Dick any warranty or liability for your use of this information. Patient Education        Learning About Low-Carbohydrate Diets  What is a low-carbohydrate diet? A low-carbohydrate (or \"low-carb\") diet limits foods and drinks that have carbohydrates. This includes grains, fruits, milk and yogurt, and starchy vegetables like potatoes, beans, and corn. It also avoids foods and drinks that have added sugar. Instead, low-carb diets include foods that are high in protein and fat. Why might you follow a low-carb diet? Low-carb diets may be used for a variety of reasons, such as for weight loss. People who have diabetes may use a low-carb diet to help manage their blood sugar levels. What should you do before you start the diet? Talk to your doctor before you try any diet. This is even more important if you have health problems like kidney disease, heart disease, or diabetes. Your doctor may suggest that you meet with a registered dietitian. A dietitian can help you make an eating plan that works for you. What foods do you eat on a low-carb diet? On a low-carb diet, you choose foods that are high in protein and fat. Examples of these are:  · Meat, poultry, and fish. · Eggs. · Nuts, such as walnuts, pecans, almonds, and peanuts. · Peanut butter and other nut butters. · Tofu. · Avocado. · Verneda Cord. · Non-starchy vegetables like broccoli, cauliflower, green beans, mushrooms, peppers, lettuce, and spinach. · Unsweetened non-dairy milks like almond milk and coconut milk. · Cheese, cottage cheese, and cream cheese. Current as of: September 8, 2021               Content Version: 13.1  © 5972-5939 Healthwise, [employer]. Care instructions adapted under license by [facility] ([facility]). If you have questions about a medical condition or this instruction, always ask your healthcare professional. [place] [address] any warranty or liability for your use of this information.
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
## 2022-03-28 DIAGNOSIS — R10.13 DYSPEPSIA: ICD-10-CM

## 2022-03-28 RX ORDER — FAMOTIDINE 40 MG/1
TABLET, FILM COATED ORAL
Qty: 30 TABLET | Refills: 11 | Status: SHIPPED | OUTPATIENT
Start: 2022-03-28

## 2022-05-21 DIAGNOSIS — M79.2 THORACIC NEURALGIA: ICD-10-CM

## 2022-05-23 RX ORDER — GABAPENTIN 300 MG/1
CAPSULE ORAL
Qty: 180 CAPSULE | Refills: 1 | Status: SHIPPED | OUTPATIENT
Start: 2022-05-23 | End: 2022-07-13

## 2022-05-26 DIAGNOSIS — F43.10 PTSD (POST-TRAUMATIC STRESS DISORDER): ICD-10-CM

## 2022-05-27 RX ORDER — ALPRAZOLAM 0.5 MG/1
TABLET ORAL
Qty: 60 TABLET | Refills: 2 | Status: SHIPPED | OUTPATIENT
Start: 2022-05-27 | End: 2022-09-19

## 2022-07-13 ENCOUNTER — TELEPHONE (OUTPATIENT)
Dept: FAMILY MEDICINE CLINIC | Age: 62
End: 2022-07-13

## 2022-07-13 ENCOUNTER — OFFICE VISIT (OUTPATIENT)
Dept: FAMILY MEDICINE CLINIC | Age: 62
End: 2022-07-13
Payer: COMMERCIAL

## 2022-07-13 VITALS
SYSTOLIC BLOOD PRESSURE: 134 MMHG | HEART RATE: 92 BPM | WEIGHT: 208.3 LBS | BODY MASS INDEX: 33.64 KG/M2 | DIASTOLIC BLOOD PRESSURE: 84 MMHG | RESPIRATION RATE: 18 BRPM

## 2022-07-13 DIAGNOSIS — M17.11 ARTHRITIS OF RIGHT KNEE: ICD-10-CM

## 2022-07-13 DIAGNOSIS — F41.1 GENERALIZED ANXIETY DISORDER: ICD-10-CM

## 2022-07-13 DIAGNOSIS — M79.2 THORACIC NEURALGIA: ICD-10-CM

## 2022-07-13 DIAGNOSIS — Z13.220 SCREENING FOR HYPERLIPIDEMIA: ICD-10-CM

## 2022-07-13 DIAGNOSIS — M32.12 SYSTEMIC LUPUS ERYTHEMATOSUS (SLE) WITH PERICARDITIS, UNSPECIFIED SLE TYPE (HCC): ICD-10-CM

## 2022-07-13 DIAGNOSIS — Z90.2 S/P LOBECTOMY OF LUNG: ICD-10-CM

## 2022-07-13 DIAGNOSIS — F43.10 PTSD (POST-TRAUMATIC STRESS DISORDER): Primary | ICD-10-CM

## 2022-07-13 DIAGNOSIS — E11.9 TYPE 2 DIABETES MELLITUS WITHOUT COMPLICATION, WITHOUT LONG-TERM CURRENT USE OF INSULIN (HCC): ICD-10-CM

## 2022-07-13 DIAGNOSIS — B44.9 INTRACAVITARY ASPERGILLUS FUNGUS BALL (HCC): ICD-10-CM

## 2022-07-13 DIAGNOSIS — N90.89 VULVAR LESION: ICD-10-CM

## 2022-07-13 DIAGNOSIS — M25.50 ARTHRALGIA OF MULTIPLE JOINTS: ICD-10-CM

## 2022-07-13 PROBLEM — A63.0 GENITAL WARTS: Status: ACTIVE | Noted: 2022-07-13

## 2022-07-13 PROBLEM — N90.3 DYSPLASIA OF VULVA: Status: ACTIVE | Noted: 2022-07-13

## 2022-07-13 LAB — HBA1C MFR BLD: 6.7 %

## 2022-07-13 PROCEDURE — G8427 DOCREV CUR MEDS BY ELIG CLIN: HCPCS | Performed by: NURSE PRACTITIONER

## 2022-07-13 PROCEDURE — 1036F TOBACCO NON-USER: CPT | Performed by: NURSE PRACTITIONER

## 2022-07-13 PROCEDURE — 3044F HG A1C LEVEL LT 7.0%: CPT | Performed by: NURSE PRACTITIONER

## 2022-07-13 PROCEDURE — 99214 OFFICE O/P EST MOD 30 MIN: CPT | Performed by: NURSE PRACTITIONER

## 2022-07-13 PROCEDURE — G8417 CALC BMI ABV UP PARAM F/U: HCPCS | Performed by: NURSE PRACTITIONER

## 2022-07-13 PROCEDURE — 2022F DILAT RTA XM EVC RTNOPTHY: CPT | Performed by: NURSE PRACTITIONER

## 2022-07-13 PROCEDURE — 83036 HEMOGLOBIN GLYCOSYLATED A1C: CPT | Performed by: NURSE PRACTITIONER

## 2022-07-13 PROCEDURE — 3017F COLORECTAL CA SCREEN DOC REV: CPT | Performed by: NURSE PRACTITIONER

## 2022-07-13 RX ORDER — FOLIC ACID 1 MG/1
1 TABLET ORAL DAILY
COMMUNITY
Start: 2022-07-09

## 2022-07-13 RX ORDER — FLUOXETINE HYDROCHLORIDE 20 MG/1
20 CAPSULE ORAL DAILY
Qty: 90 CAPSULE | Refills: 3 | Status: SHIPPED | OUTPATIENT
Start: 2022-07-13

## 2022-07-13 ASSESSMENT — PATIENT HEALTH QUESTIONNAIRE - PHQ9
2. FEELING DOWN, DEPRESSED OR HOPELESS: 0
SUM OF ALL RESPONSES TO PHQ QUESTIONS 1-9: 0
4. FEELING TIRED OR HAVING LITTLE ENERGY: 0
10. IF YOU CHECKED OFF ANY PROBLEMS, HOW DIFFICULT HAVE THESE PROBLEMS MADE IT FOR YOU TO DO YOUR WORK, TAKE CARE OF THINGS AT HOME, OR GET ALONG WITH OTHER PEOPLE: 0
SUM OF ALL RESPONSES TO PHQ9 QUESTIONS 1 & 2: 0
7. TROUBLE CONCENTRATING ON THINGS, SUCH AS READING THE NEWSPAPER OR WATCHING TELEVISION: 0
5. POOR APPETITE OR OVEREATING: 0
SUM OF ALL RESPONSES TO PHQ QUESTIONS 1-9: 0
6. FEELING BAD ABOUT YOURSELF - OR THAT YOU ARE A FAILURE OR HAVE LET YOURSELF OR YOUR FAMILY DOWN: 0
8. MOVING OR SPEAKING SO SLOWLY THAT OTHER PEOPLE COULD HAVE NOTICED. OR THE OPPOSITE, BEING SO FIGETY OR RESTLESS THAT YOU HAVE BEEN MOVING AROUND A LOT MORE THAN USUAL: 0
1. LITTLE INTEREST OR PLEASURE IN DOING THINGS: 0
SUM OF ALL RESPONSES TO PHQ QUESTIONS 1-9: 0
9. THOUGHTS THAT YOU WOULD BE BETTER OFF DEAD, OR OF HURTING YOURSELF: 0
3. TROUBLE FALLING OR STAYING ASLEEP: 0
SUM OF ALL RESPONSES TO PHQ QUESTIONS 1-9: 0

## 2022-07-13 ASSESSMENT — ENCOUNTER SYMPTOMS
BACK PAIN: 1
COUGH: 1
ABDOMINAL PAIN: 0
NAUSEA: 0
SHORTNESS OF BREATH: 0

## 2022-07-13 NOTE — PATIENT INSTRUCTIONS
Labor Progress Note - Unk Jeet 23 y o  female MRN: [id number]    Unit/Bed#: L&D 321-01 Encounter: [id number]       Contraction Frequency (minutes): 2-4  Contraction Quality: Mild  Tachysystole: No   Cervical Dilation: 3-4        Cervical Effacement: 60  Fetal Station: -3  Baseline Rate: 130 bpm  Fetal Heart Rate: 135 BPM  FHR Category: Category I               Vital Signs:   Vitals:    07/13/22 0048   BP: 116/57   Pulse: 72   Resp:    Temp: 98 3 °F (36 8 °C)       Notes/comments:    Patient no 4 hours status post Cytotec  Chau balloon dislodged  SVE at this time 3-4/60/-3  Patient becoming more uncomfortable with contractions    Will plan to begin Pitocin titration    Maria Esther Lorenzo MD 7/13/2022 1:53 AM You may receive a survey regarding the care you received during your visit. Your input is valuable to us. We encourage you to complete and return your survey. We hope you will choose us in the future for your healthcare needs.

## 2022-07-13 NOTE — TELEPHONE ENCOUNTER
Irina Horner with [facility] called office to let us know the pt came to their lab and an A1c was drawn. Unfortunately someone processed pt's blood under a different pt. Pt is aware of the error. Any further questions call Irina Horner back at [phone].
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
## 2022-07-29 LAB
ABSOLUTE BASO #: 0.05 K/UL (ref 0–0.2)
ABSOLUTE EOS #: 0.14 K/UL (ref 0–0.5)
ABSOLUTE LYMPH #: 2.36 K/UL (ref 1–4)
ABSOLUTE MONO #: 0.51 K/UL (ref 0.2–1)
ABSOLUTE NEUT #: 3.31 K/UL (ref 1.5–7.5)
ALBUMIN SERPL-MCNC: 4.3 G/DL (ref 3.5–5.2)
ALK PHOSPHATASE: 63 U/L (ref 40–136)
ALT SERPL-CCNC: 8 U/L (ref 5–40)
ANION GAP SERPL CALCULATED.3IONS-SCNC: 9 MEQ/L (ref 10–19)
AST SERPL-CCNC: 17 U/L (ref 9–40)
BASOPHILS RELATIVE PERCENT: 0.8 %
BILIRUB SERPL-MCNC: 0.5 MG/DL
BUN BLDV-MCNC: 12 MG/DL (ref 6–20)
CALCIUM SERPL-MCNC: 9.4 MG/DL (ref 8.5–10.5)
CHLORIDE BLD-SCNC: 101 MEQ/L (ref 95–107)
CHOLESTEROL/HDL RATIO: 3.3 RATIO
CHOLESTEROL: 214 MG/DL
CO2: 30 MEQ/L (ref 19–31)
CREAT SERPL-MCNC: 1.03 MG/DL (ref 0.6–1.3)
EGFR IF NONAFRICAN AMERICAN: 63 ML/MIN/1.73
EOSINOPHILS RELATIVE PERCENT: 2.2 %
GLUCOSE: 96 MG/DL (ref 70–99)
HCT VFR BLD CALC: 39.4 % (ref 34–45)
HDLC SERPL-MCNC: 64 MG/DL
HEMOGLOBIN: 13 G/DL (ref 11.5–15.5)
LDL CHOLESTEROL CALCULATED: 133 MG/DL
LDL/HDL RATIO: 2.1 RATIO
LYMPHOCYTE %: 37 %
MCH RBC QN AUTO: 26.5 PG (ref 25–33)
MCHC RBC AUTO-ENTMCNC: 33 G/DL (ref 31–36)
MCV RBC AUTO: 80.2 FL (ref 80–99)
MONOCYTES # BLD: 8 %
NEUTROPHILS RELATIVE PERCENT: 51.8 %
PDW BLD-RTO: 13.6 % (ref 11.5–15)
PLATELETS: 291 K/UL (ref 130–400)
PMV BLD AUTO: 9.9 FL (ref 9.3–13)
POTASSIUM SERPL-SCNC: 5.1 MEQ/L (ref 3.5–5.4)
RBC: 4.91 M/UL (ref 3.8–5.4)
SODIUM BLD-SCNC: 140 MEQ/L (ref 133–146)
TOTAL PROTEIN: 7.1 G/DL (ref 6.1–8.3)
TRIGL SERPL-MCNC: 87 MG/DL
TSH SERPL DL<=0.05 MIU/L-ACNC: 0.76 UIU/ML (ref 0.4–4.1)
VLDLC SERPL CALC-MCNC: 17 MG/DL
WBC: 6.4 K/UL (ref 3.5–11)

## 2022-07-30 LAB
ALBUMIN SERPL-MCNC: NORMAL G/DL
ALP BLD-CCNC: NORMAL U/L
ALT SERPL-CCNC: NORMAL U/L
ANION GAP SERPL CALCULATED.3IONS-SCNC: NORMAL MMOL/L
AST SERPL-CCNC: NORMAL U/L
AVERAGE GLUCOSE: 154
AVERAGE GLUCOSE: 154 MG/DL
BILIRUB SERPL-MCNC: NORMAL MG/DL
BUN BLDV-MCNC: NORMAL MG/DL
CALCIUM SERPL-MCNC: NORMAL MG/DL
CHLORIDE BLD-SCNC: NORMAL MMOL/L
CHOLESTEROL, TOTAL: 214 MG/DL
CHOLESTEROL/HDL RATIO: 2.1
CO2: NORMAL
CREAT SERPL-MCNC: NORMAL MG/DL
GFR CALCULATED: NORMAL
GLUCOSE BLD-MCNC: 96 MG/DL
HBA1C MFR BLD: 7 %
HBA1C MFR BLD: 7 % (ref 4.2–5.6)
HDLC SERPL-MCNC: 64 MG/DL (ref 35–70)
LDL CHOLESTEROL CALCULATED: 133 MG/DL (ref 0–160)
NONHDLC SERPL-MCNC: NORMAL MG/DL
POTASSIUM SERPL-SCNC: 5.1 MMOL/L
SODIUM BLD-SCNC: NORMAL MMOL/L
TOTAL PROTEIN: NORMAL
TRIGL SERPL-MCNC: 87 MG/DL
TSH SERPL DL<=0.05 MIU/L-ACNC: 0.76 UIU/ML
VLDLC SERPL CALC-MCNC: 17 MG/DL

## 2022-08-18 ENCOUNTER — OFFICE VISIT (OUTPATIENT)
Dept: FAMILY MEDICINE CLINIC | Age: 62
End: 2022-08-18
Payer: COMMERCIAL

## 2022-08-18 VITALS
OXYGEN SATURATION: 98 % | DIASTOLIC BLOOD PRESSURE: 82 MMHG | RESPIRATION RATE: 16 BRPM | HEART RATE: 71 BPM | BODY MASS INDEX: 33.9 KG/M2 | SYSTOLIC BLOOD PRESSURE: 118 MMHG | WEIGHT: 209.9 LBS

## 2022-08-18 DIAGNOSIS — F43.10 PTSD (POST-TRAUMATIC STRESS DISORDER): ICD-10-CM

## 2022-08-18 DIAGNOSIS — M25.50 ARTHRALGIA OF MULTIPLE JOINTS: ICD-10-CM

## 2022-08-18 DIAGNOSIS — F41.1 GENERALIZED ANXIETY DISORDER: ICD-10-CM

## 2022-08-18 DIAGNOSIS — M32.12 SYSTEMIC LUPUS ERYTHEMATOSUS (SLE) WITH PERICARDITIS, UNSPECIFIED SLE TYPE (HCC): ICD-10-CM

## 2022-08-18 DIAGNOSIS — E11.9 TYPE 2 DIABETES MELLITUS WITHOUT COMPLICATION, WITHOUT LONG-TERM CURRENT USE OF INSULIN (HCC): Primary | ICD-10-CM

## 2022-08-18 DIAGNOSIS — E66.1 CLASS 1 DRUG-INDUCED OBESITY WITH SERIOUS COMORBIDITY AND BODY MASS INDEX (BMI) OF 33.0 TO 33.9 IN ADULT: ICD-10-CM

## 2022-08-18 PROCEDURE — 2022F DILAT RTA XM EVC RTNOPTHY: CPT | Performed by: NURSE PRACTITIONER

## 2022-08-18 PROCEDURE — 3051F HG A1C>EQUAL 7.0%<8.0%: CPT | Performed by: NURSE PRACTITIONER

## 2022-08-18 PROCEDURE — 3017F COLORECTAL CA SCREEN DOC REV: CPT | Performed by: NURSE PRACTITIONER

## 2022-08-18 PROCEDURE — 1036F TOBACCO NON-USER: CPT | Performed by: NURSE PRACTITIONER

## 2022-08-18 PROCEDURE — 99214 OFFICE O/P EST MOD 30 MIN: CPT | Performed by: NURSE PRACTITIONER

## 2022-08-18 PROCEDURE — G8417 CALC BMI ABV UP PARAM F/U: HCPCS | Performed by: NURSE PRACTITIONER

## 2022-08-18 PROCEDURE — G8428 CUR MEDS NOT DOCUMENT: HCPCS | Performed by: NURSE PRACTITIONER

## 2022-08-18 ASSESSMENT — ENCOUNTER SYMPTOMS
NAUSEA: 0
COUGH: 0
SHORTNESS OF BREATH: 0
ABDOMINAL PAIN: 0
BACK PAIN: 1
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
## 2022-09-16 DIAGNOSIS — M54.9 CHRONIC BACK PAIN, UNSPECIFIED BACK LOCATION, UNSPECIFIED BACK PAIN LATERALITY: ICD-10-CM

## 2022-09-16 DIAGNOSIS — G89.29 CHRONIC BACK PAIN, UNSPECIFIED BACK LOCATION, UNSPECIFIED BACK PAIN LATERALITY: ICD-10-CM

## 2022-09-16 DIAGNOSIS — M17.11 ARTHRITIS OF RIGHT KNEE: ICD-10-CM

## 2022-09-17 DIAGNOSIS — F43.10 PTSD (POST-TRAUMATIC STRESS DISORDER): ICD-10-CM

## 2022-09-17 DIAGNOSIS — M17.11 ARTHRITIS OF RIGHT KNEE: ICD-10-CM

## 2022-09-17 DIAGNOSIS — M54.9 CHRONIC BACK PAIN, UNSPECIFIED BACK LOCATION, UNSPECIFIED BACK PAIN LATERALITY: ICD-10-CM

## 2022-09-17 DIAGNOSIS — G89.29 CHRONIC BACK PAIN, UNSPECIFIED BACK LOCATION, UNSPECIFIED BACK PAIN LATERALITY: ICD-10-CM

## 2022-09-19 RX ORDER — ALPRAZOLAM 0.5 MG/1
TABLET ORAL
Qty: 60 TABLET | Refills: 2 | Status: SHIPPED | OUTPATIENT
Start: 2022-09-19 | End: 2022-12-18

## 2022-09-19 RX ORDER — ACETAMINOPHEN 500 MG/1
TABLET ORAL
Qty: 40 TABLET | Refills: 3 | Status: SHIPPED | OUTPATIENT
Start: 2022-09-19

## 2022-09-19 RX ORDER — MELOXICAM 15 MG/1
TABLET ORAL
Qty: 90 TABLET | Refills: 1 | Status: SHIPPED | OUTPATIENT
Start: 2022-09-19

## 2022-09-21 ENCOUNTER — OFFICE VISIT (OUTPATIENT)
Dept: FAMILY MEDICINE CLINIC | Age: 62
End: 2022-09-21
Payer: COMMERCIAL

## 2022-09-21 VITALS
SYSTOLIC BLOOD PRESSURE: 138 MMHG | WEIGHT: 205.9 LBS | RESPIRATION RATE: 20 BRPM | DIASTOLIC BLOOD PRESSURE: 88 MMHG | HEIGHT: 66 IN | BODY MASS INDEX: 33.09 KG/M2 | HEART RATE: 92 BPM

## 2022-09-21 DIAGNOSIS — B35.1 ONYCHOMYCOSIS: Primary | ICD-10-CM

## 2022-09-21 PROCEDURE — G8417 CALC BMI ABV UP PARAM F/U: HCPCS | Performed by: NURSE PRACTITIONER

## 2022-09-21 PROCEDURE — 3017F COLORECTAL CA SCREEN DOC REV: CPT | Performed by: NURSE PRACTITIONER

## 2022-09-21 PROCEDURE — 99213 OFFICE O/P EST LOW 20 MIN: CPT | Performed by: NURSE PRACTITIONER

## 2022-09-21 PROCEDURE — 1036F TOBACCO NON-USER: CPT | Performed by: NURSE PRACTITIONER

## 2022-09-21 PROCEDURE — G8427 DOCREV CUR MEDS BY ELIG CLIN: HCPCS | Performed by: NURSE PRACTITIONER

## 2022-09-21 RX ORDER — TERBINAFINE HYDROCHLORIDE 250 MG/1
250 TABLET ORAL DAILY
Qty: 60 TABLET | Refills: 0 | Status: SHIPPED | OUTPATIENT
Start: 2022-09-21 | End: 2022-11-20

## 2022-09-21 ASSESSMENT — ENCOUNTER SYMPTOMS
ABDOMINAL PAIN: 0
SHORTNESS OF BREATH: 0
NAUSEA: 0
BACK PAIN: 1
COUGH: 0

## 2022-09-21 NOTE — LETTER
[address] [address]  Phone: [phone]  Fax: [phone] [facility], APRN - CNP        September 21, 2022     Patient: Oscar Wakefield. Puckett   YOB: 1960   Date of Visit: 9/21/2022       To Whom it May Concern:    Eron Haskins was seen in my clinic on 9/21/2022. If you have any questions or concerns, please don't hesitate to call.     Sincerely,         ESTHER Jennings CNP
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
## 2022-11-16 DIAGNOSIS — M79.2 THORACIC NEURALGIA: ICD-10-CM

## 2022-11-16 RX ORDER — GABAPENTIN 300 MG/1
CAPSULE ORAL
Qty: 180 CAPSULE | Refills: 1 | Status: SHIPPED | OUTPATIENT
Start: 2022-11-16 | End: 2023-05-15

## 2022-12-12 DIAGNOSIS — F43.10 PTSD (POST-TRAUMATIC STRESS DISORDER): ICD-10-CM

## 2022-12-12 DIAGNOSIS — B35.1 ONYCHOMYCOSIS: ICD-10-CM

## 2022-12-12 RX ORDER — ALPRAZOLAM 0.5 MG/1
TABLET ORAL
Qty: 60 TABLET | Refills: 2 | Status: SHIPPED | OUTPATIENT
Start: 2022-12-12 | End: 2023-03-12

## 2022-12-12 RX ORDER — TERBINAFINE HYDROCHLORIDE 250 MG/1
250 TABLET ORAL DAILY
Qty: 60 TABLET | Refills: 0 | OUTPATIENT
Start: 2022-12-12 | End: 2023-02-10

## 2022-12-27 DIAGNOSIS — B35.1 ONYCHOMYCOSIS: ICD-10-CM

## 2022-12-27 RX ORDER — TERBINAFINE HYDROCHLORIDE 250 MG/1
250 TABLET ORAL DAILY
Qty: 60 TABLET | Refills: 0 | OUTPATIENT
Start: 2022-12-27 | End: 2023-02-25

## 2022-12-27 NOTE — TELEPHONE ENCOUNTER
Fax received from [address] [place] [address] requesting a refill on the patients terbinafine HCL 250mg tabs. Order pended for your signature.

## 2023-01-09 ENCOUNTER — TELEPHONE (OUTPATIENT)
Dept: FAMILY MEDICINE CLINIC | Age: 63
End: 2023-01-09

## 2023-01-09 NOTE — TELEPHONE ENCOUNTER
Pt called office stating she has an upcoming appt with us 1/12/23 and wants to know if she has blood work due. Advised yes, labs were ordered 8/18/22. Pt will go to [facility] and have them pull orders from [address] [place] [address] Rd. Pt is aware no fasting is required.

## 2023-01-11 ENCOUNTER — NURSE ONLY (OUTPATIENT)
Dept: LAB | Age: 63
End: 2023-01-11

## 2023-01-11 DIAGNOSIS — E11.9 TYPE 2 DIABETES MELLITUS WITHOUT COMPLICATION, WITHOUT LONG-TERM CURRENT USE OF INSULIN (HCC): ICD-10-CM

## 2023-01-11 LAB
AVERAGE GLUCOSE: 138 MG/DL (ref 70–126)
CREATININE, URINE: 161.6 MG/DL
HBA1C MFR BLD: 6.6 % (ref 4.4–6.4)
HCT VFR BLD CALC: 40.3 % (ref 37–47)
HEMOGLOBIN: 12.7 GM/DL (ref 12–16)
MICROALBUMIN UR-MCNC: 2.54 MG/DL
MICROALBUMIN/CREAT UR-RTO: 16 MG/G (ref 0–30)

## 2023-01-16 ENCOUNTER — OFFICE VISIT (OUTPATIENT)
Dept: FAMILY MEDICINE CLINIC | Age: 63
End: 2023-01-16
Payer: COMMERCIAL

## 2023-01-16 VITALS
RESPIRATION RATE: 18 BRPM | DIASTOLIC BLOOD PRESSURE: 86 MMHG | BODY MASS INDEX: 32.93 KG/M2 | WEIGHT: 204 LBS | SYSTOLIC BLOOD PRESSURE: 136 MMHG | HEART RATE: 68 BPM

## 2023-01-16 DIAGNOSIS — M54.9 CHRONIC BACK PAIN, UNSPECIFIED BACK LOCATION, UNSPECIFIED BACK PAIN LATERALITY: ICD-10-CM

## 2023-01-16 DIAGNOSIS — J31.0 RHINOSINUSITIS: Primary | ICD-10-CM

## 2023-01-16 DIAGNOSIS — E66.1 CLASS 1 DRUG-INDUCED OBESITY WITH SERIOUS COMORBIDITY AND BODY MASS INDEX (BMI) OF 33.0 TO 33.9 IN ADULT: ICD-10-CM

## 2023-01-16 DIAGNOSIS — E55.9 VITAMIN D DEFICIENCY: ICD-10-CM

## 2023-01-16 DIAGNOSIS — M17.11 ARTHRITIS OF RIGHT KNEE: ICD-10-CM

## 2023-01-16 DIAGNOSIS — J32.9 RHINOSINUSITIS: Primary | ICD-10-CM

## 2023-01-16 DIAGNOSIS — E11.9 TYPE 2 DIABETES MELLITUS WITHOUT COMPLICATION, WITHOUT LONG-TERM CURRENT USE OF INSULIN (HCC): ICD-10-CM

## 2023-01-16 DIAGNOSIS — M32.12 SYSTEMIC LUPUS ERYTHEMATOSUS (SLE) WITH PERICARDITIS, UNSPECIFIED SLE TYPE (HCC): ICD-10-CM

## 2023-01-16 DIAGNOSIS — M25.50 ARTHRALGIA OF MULTIPLE JOINTS: ICD-10-CM

## 2023-01-16 DIAGNOSIS — G89.29 CHRONIC BACK PAIN, UNSPECIFIED BACK LOCATION, UNSPECIFIED BACK PAIN LATERALITY: ICD-10-CM

## 2023-01-16 DIAGNOSIS — F41.1 GENERALIZED ANXIETY DISORDER: ICD-10-CM

## 2023-01-16 PROBLEM — F41.9 ANXIETY: Status: ACTIVE | Noted: 2023-01-16

## 2023-01-16 PROBLEM — M47.816 SPONDYLOSIS OF LUMBAR SPINE: Status: ACTIVE | Noted: 2023-01-16

## 2023-01-16 PROBLEM — R91.8 LUNG FIELD ABNORMAL: Status: ACTIVE | Noted: 2023-01-16

## 2023-01-16 PROBLEM — N91.4 SECONDARY OLIGOMENORRHEA: Status: ACTIVE | Noted: 2023-01-16

## 2023-01-16 PROBLEM — N87.9 CERVICAL DYSPLASIA: Status: ACTIVE | Noted: 2023-01-16

## 2023-01-16 PROCEDURE — 3079F DIAST BP 80-89 MM HG: CPT | Performed by: NURSE PRACTITIONER

## 2023-01-16 PROCEDURE — 2022F DILAT RTA XM EVC RTNOPTHY: CPT | Performed by: NURSE PRACTITIONER

## 2023-01-16 PROCEDURE — 1036F TOBACCO NON-USER: CPT | Performed by: NURSE PRACTITIONER

## 2023-01-16 PROCEDURE — 99214 OFFICE O/P EST MOD 30 MIN: CPT | Performed by: NURSE PRACTITIONER

## 2023-01-16 PROCEDURE — G8484 FLU IMMUNIZE NO ADMIN: HCPCS | Performed by: NURSE PRACTITIONER

## 2023-01-16 PROCEDURE — G8417 CALC BMI ABV UP PARAM F/U: HCPCS | Performed by: NURSE PRACTITIONER

## 2023-01-16 PROCEDURE — G8427 DOCREV CUR MEDS BY ELIG CLIN: HCPCS | Performed by: NURSE PRACTITIONER

## 2023-01-16 PROCEDURE — 3017F COLORECTAL CA SCREEN DOC REV: CPT | Performed by: NURSE PRACTITIONER

## 2023-01-16 PROCEDURE — 3044F HG A1C LEVEL LT 7.0%: CPT | Performed by: NURSE PRACTITIONER

## 2023-01-16 PROCEDURE — 3075F SYST BP GE 130 - 139MM HG: CPT | Performed by: NURSE PRACTITIONER

## 2023-01-16 RX ORDER — AMOXICILLIN AND CLAVULANATE POTASSIUM 875; 125 MG/1; MG/1
1 TABLET, FILM COATED ORAL 2 TIMES DAILY WITH MEALS
Qty: 14 TABLET | Refills: 0 | Status: SHIPPED | OUTPATIENT
Start: 2023-01-16 | End: 2023-01-23

## 2023-01-16 RX ORDER — LIDOCAINE 50 MG/G
1 PATCH TOPICAL DAILY
COMMUNITY
Start: 2022-12-28

## 2023-01-16 RX ORDER — MELOXICAM 15 MG/1
TABLET ORAL
Qty: 90 TABLET | Refills: 3 | Status: SHIPPED | OUTPATIENT
Start: 2023-01-16

## 2023-01-16 RX ORDER — METRONIDAZOLE 500 MG/1
500 TABLET ORAL 2 TIMES DAILY
COMMUNITY

## 2023-01-16 ASSESSMENT — ENCOUNTER SYMPTOMS
ABDOMINAL PAIN: 0
SHORTNESS OF BREATH: 0
BACK PAIN: 1
NAUSEA: 0
COUGH: 1

## 2023-01-16 ASSESSMENT — PATIENT HEALTH QUESTIONNAIRE - PHQ9
9. THOUGHTS THAT YOU WOULD BE BETTER OFF DEAD, OR OF HURTING YOURSELF: 0
7. TROUBLE CONCENTRATING ON THINGS, SUCH AS READING THE NEWSPAPER OR WATCHING TELEVISION: 0
8. MOVING OR SPEAKING SO SLOWLY THAT OTHER PEOPLE COULD HAVE NOTICED. OR THE OPPOSITE, BEING SO FIGETY OR RESTLESS THAT YOU HAVE BEEN MOVING AROUND A LOT MORE THAN USUAL: 0
1. LITTLE INTEREST OR PLEASURE IN DOING THINGS: 0
SUM OF ALL RESPONSES TO PHQ QUESTIONS 1-9: 0
SUM OF ALL RESPONSES TO PHQ QUESTIONS 1-9: 0
6. FEELING BAD ABOUT YOURSELF - OR THAT YOU ARE A FAILURE OR HAVE LET YOURSELF OR YOUR FAMILY DOWN: 0
SUM OF ALL RESPONSES TO PHQ9 QUESTIONS 1 & 2: 0
3. TROUBLE FALLING OR STAYING ASLEEP: 0
SUM OF ALL RESPONSES TO PHQ QUESTIONS 1-9: 0
SUM OF ALL RESPONSES TO PHQ QUESTIONS 1-9: 0
2. FEELING DOWN, DEPRESSED OR HOPELESS: 0
10. IF YOU CHECKED OFF ANY PROBLEMS, HOW DIFFICULT HAVE THESE PROBLEMS MADE IT FOR YOU TO DO YOUR WORK, TAKE CARE OF THINGS AT HOME, OR GET ALONG WITH OTHER PEOPLE: 0
4. FEELING TIRED OR HAVING LITTLE ENERGY: 0
5. POOR APPETITE OR OVEREATING: 0
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
## 2023-03-06 DIAGNOSIS — R10.13 DYSPEPSIA: ICD-10-CM

## 2023-03-06 RX ORDER — FAMOTIDINE 40 MG/1
TABLET, FILM COATED ORAL
Qty: 30 TABLET | Refills: 11 | Status: SHIPPED | OUTPATIENT
Start: 2023-03-06

## 2023-03-09 DIAGNOSIS — F43.10 PTSD (POST-TRAUMATIC STRESS DISORDER): ICD-10-CM

## 2023-03-09 RX ORDER — ALPRAZOLAM 0.5 MG/1
TABLET ORAL
Qty: 60 TABLET | Refills: 2 | Status: SHIPPED | OUTPATIENT
Start: 2023-03-09 | End: 2023-06-07

## 2023-03-09 NOTE — TELEPHONE ENCOUNTER
Fax received from [facility] requesting a refill on the patients Xanax.  Order pended for your signature.

## 2023-05-31 RX ORDER — METRONIDAZOLE 500 MG/1
TABLET ORAL
Qty: 14 TABLET | OUTPATIENT
Start: 2023-05-31

## 2023-06-28 ENCOUNTER — TELEPHONE (OUTPATIENT)
Dept: FAMILY MEDICINE CLINIC | Age: 63
End: 2023-06-28

## 2023-06-28 ENCOUNTER — OFFICE VISIT (OUTPATIENT)
Dept: FAMILY MEDICINE CLINIC | Age: 63
End: 2023-06-28
Payer: MEDICAID

## 2023-06-28 VITALS
SYSTOLIC BLOOD PRESSURE: 114 MMHG | HEART RATE: 76 BPM | BODY MASS INDEX: 31.15 KG/M2 | DIASTOLIC BLOOD PRESSURE: 76 MMHG | WEIGHT: 193 LBS | RESPIRATION RATE: 18 BRPM

## 2023-06-28 DIAGNOSIS — M54.9 CHRONIC BACK PAIN, UNSPECIFIED BACK LOCATION, UNSPECIFIED BACK PAIN LATERALITY: ICD-10-CM

## 2023-06-28 DIAGNOSIS — E11.42 DIABETIC PERIPHERAL NEUROPATHY (HCC): ICD-10-CM

## 2023-06-28 DIAGNOSIS — B35.1 ONYCHOMYCOSIS: ICD-10-CM

## 2023-06-28 DIAGNOSIS — F43.10 PTSD (POST-TRAUMATIC STRESS DISORDER): ICD-10-CM

## 2023-06-28 DIAGNOSIS — M32.12 SYSTEMIC LUPUS ERYTHEMATOSUS (SLE) WITH PERICARDITIS, UNSPECIFIED SLE TYPE (HCC): ICD-10-CM

## 2023-06-28 DIAGNOSIS — E66.1 CLASS 1 DRUG-INDUCED OBESITY WITH SERIOUS COMORBIDITY AND BODY MASS INDEX (BMI) OF 33.0 TO 33.9 IN ADULT: ICD-10-CM

## 2023-06-28 DIAGNOSIS — M25.50 ARTHRALGIA OF MULTIPLE JOINTS: ICD-10-CM

## 2023-06-28 DIAGNOSIS — E11.9 TYPE 2 DIABETES MELLITUS WITHOUT COMPLICATION, WITHOUT LONG-TERM CURRENT USE OF INSULIN (HCC): Primary | ICD-10-CM

## 2023-06-28 DIAGNOSIS — M17.11 ARTHRITIS OF RIGHT KNEE: ICD-10-CM

## 2023-06-28 DIAGNOSIS — B44.9 INTRACAVITARY ASPERGILLUS FUNGUS BALL (HCC): ICD-10-CM

## 2023-06-28 DIAGNOSIS — G89.29 CHRONIC BACK PAIN, UNSPECIFIED BACK LOCATION, UNSPECIFIED BACK PAIN LATERALITY: ICD-10-CM

## 2023-06-28 DIAGNOSIS — E55.9 VITAMIN D DEFICIENCY: ICD-10-CM

## 2023-06-28 PROBLEM — M17.0 BILATERAL PRIMARY OSTEOARTHRITIS OF KNEE: Status: ACTIVE | Noted: 2023-06-28

## 2023-06-28 PROCEDURE — 3074F SYST BP LT 130 MM HG: CPT | Performed by: NURSE PRACTITIONER

## 2023-06-28 PROCEDURE — 3078F DIAST BP <80 MM HG: CPT | Performed by: NURSE PRACTITIONER

## 2023-06-28 PROCEDURE — 99214 OFFICE O/P EST MOD 30 MIN: CPT | Performed by: NURSE PRACTITIONER

## 2023-06-28 PROCEDURE — 3044F HG A1C LEVEL LT 7.0%: CPT | Performed by: NURSE PRACTITIONER

## 2023-06-28 RX ORDER — FOLIC ACID 1 MG/1
1000 TABLET ORAL DAILY
Qty: 90 TABLET | Refills: 3 | Status: SHIPPED | OUTPATIENT
Start: 2023-06-28

## 2023-06-28 RX ORDER — FLUOXETINE HYDROCHLORIDE 20 MG/1
20 CAPSULE ORAL DAILY
Qty: 90 CAPSULE | Refills: 3 | Status: SHIPPED | OUTPATIENT
Start: 2023-06-28

## 2023-06-28 RX ORDER — EFINACONAZOLE 100 MG/ML
SOLUTION TOPICAL
Qty: 8 ML | Refills: 11 | Status: SHIPPED | OUTPATIENT
Start: 2023-06-28

## 2023-06-28 RX ORDER — ALPRAZOLAM 0.5 MG/1
TABLET ORAL
Qty: 60 TABLET | OUTPATIENT
Start: 2023-06-28

## 2023-06-28 RX ORDER — ALPRAZOLAM 0.5 MG/1
TABLET ORAL
Qty: 60 TABLET | Refills: 2 | Status: SHIPPED | OUTPATIENT
Start: 2023-06-28 | End: 2023-09-26

## 2023-06-28 RX ORDER — LIDOCAINE 50 MG/G
1 PATCH TOPICAL DAILY
Qty: 30 PATCH | Refills: 1 | Status: SHIPPED | OUTPATIENT
Start: 2023-06-28

## 2023-06-28 SDOH — ECONOMIC STABILITY: FOOD INSECURITY: WITHIN THE PAST 12 MONTHS, THE FOOD YOU BOUGHT JUST DIDN'T LAST AND YOU DIDN'T HAVE MONEY TO GET MORE.: NEVER TRUE

## 2023-06-28 SDOH — ECONOMIC STABILITY: FOOD INSECURITY: WITHIN THE PAST 12 MONTHS, YOU WORRIED THAT YOUR FOOD WOULD RUN OUT BEFORE YOU GOT MONEY TO BUY MORE.: NEVER TRUE

## 2023-06-28 SDOH — ECONOMIC STABILITY: INCOME INSECURITY: HOW HARD IS IT FOR YOU TO PAY FOR THE VERY BASICS LIKE FOOD, HOUSING, MEDICAL CARE, AND HEATING?: NOT HARD AT ALL

## 2023-06-28 SDOH — ECONOMIC STABILITY: HOUSING INSECURITY
IN THE LAST 12 MONTHS, WAS THERE A TIME WHEN YOU DID NOT HAVE A STEADY PLACE TO SLEEP OR SLEPT IN A SHELTER (INCLUDING NOW)?: NO

## 2023-06-28 ASSESSMENT — ENCOUNTER SYMPTOMS
SHORTNESS OF BREATH: 0
COUGH: 1
BACK PAIN: 1
NAUSEA: 0
ABDOMINAL PAIN: 0

## 2023-06-29 ENCOUNTER — TELEPHONE (OUTPATIENT)
Dept: FAMILY MEDICINE CLINIC | Age: 63
End: 2023-06-29

## 2023-09-27 ENCOUNTER — COMMUNITY OUTREACH (OUTPATIENT)
Dept: FAMILY MEDICINE CLINIC | Age: 63
End: 2023-09-27

## 2023-10-02 ENCOUNTER — COMMUNITY OUTREACH (OUTPATIENT)
Dept: FAMILY MEDICINE CLINIC | Age: 63
End: 2023-10-02

## 2023-10-11 RX ORDER — METRONIDAZOLE 500 MG/1
500 TABLET ORAL 2 TIMES DAILY
Qty: 14 TABLET | OUTPATIENT
Start: 2023-10-11 | End: 2023-10-18

## 2023-10-26 DIAGNOSIS — E11.42 DIABETIC PERIPHERAL NEUROPATHY (HCC): ICD-10-CM

## 2023-10-26 RX ORDER — LIDOCAINE 50 MG/G
PATCH TOPICAL
Qty: 30 PATCH | Refills: 1 | Status: SHIPPED | OUTPATIENT
Start: 2023-10-26

## 2023-11-20 DIAGNOSIS — M54.9 CHRONIC BACK PAIN, UNSPECIFIED BACK LOCATION, UNSPECIFIED BACK PAIN LATERALITY: ICD-10-CM

## 2023-11-20 DIAGNOSIS — G89.29 CHRONIC BACK PAIN, UNSPECIFIED BACK LOCATION, UNSPECIFIED BACK PAIN LATERALITY: ICD-10-CM

## 2023-11-20 DIAGNOSIS — M17.11 ARTHRITIS OF RIGHT KNEE: ICD-10-CM

## 2023-11-20 RX ORDER — PSEUDOEPHED/ACETAMINOPH/DIPHEN 30MG-500MG
TABLET ORAL
Qty: 40 TABLET | Refills: 3 | Status: SHIPPED | OUTPATIENT
Start: 2023-11-20

## 2023-12-20 DIAGNOSIS — F43.10 PTSD (POST-TRAUMATIC STRESS DISORDER): ICD-10-CM

## 2023-12-20 RX ORDER — ALPRAZOLAM 0.5 MG/1
TABLET ORAL
Qty: 60 TABLET | OUTPATIENT
Start: 2023-12-20

## 2023-12-22 ENCOUNTER — TELEPHONE (OUTPATIENT)
Dept: FAMILY MEDICINE CLINIC | Age: 63
End: 2023-12-22

## 2023-12-22 DIAGNOSIS — I87.2 CHRONIC VENOUS INSUFFICIENCY: Primary | ICD-10-CM

## 2023-12-22 NOTE — TELEPHONE ENCOUNTER
Pt called office stating she tried to get her compression stockings through [employer]. She was told the diagnosis that was added to the order is not covered by insurance. Pt was given a list of covered diagnosis, she would like you to review her chart to see if any of them pertain to her. We are to call pt back. Please advise. Covered diagnosis:    Lymphedema  [place]'s  Ortho static hypotension  Stasis dermatitis  Stasis ulcer  Chronic venous insufficiency     If there is another diagnosis that can be added to the order, pt asked that we fax it to [employer] at [phone].

## 2023-12-27 NOTE — TELEPHONE ENCOUNTER
Script faxed to [id number] [employer] at [phone]. Attempted to update the patient, left a VM updating her on the script being faxed.

## 2024-01-02 ENCOUNTER — TELEPHONE (OUTPATIENT)
Dept: FAMILY MEDICINE CLINIC | Age: 64
End: 2024-01-02

## 2024-01-02 DIAGNOSIS — S22.39XD CLOSED FRACTURE OF ONE RIB WITH ROUTINE HEALING, UNSPECIFIED LATERALITY, SUBSEQUENT ENCOUNTER: Primary | ICD-10-CM

## 2024-01-02 RX ORDER — ETODOLAC 400 MG/1
400 TABLET, FILM COATED ORAL 2 TIMES DAILY
Qty: 30 TABLET | Refills: 0 | Status: SHIPPED | OUTPATIENT
Start: 2024-01-02 | End: 2025-01-01

## 2024-01-02 RX ORDER — TIZANIDINE 2 MG/1
2 TABLET ORAL 3 TIMES DAILY PRN
Qty: 30 TABLET | Refills: 0 | Status: SHIPPED | OUTPATIENT
Start: 2024-01-02

## 2024-01-02 NOTE — TELEPHONE ENCOUNTER
Patient seen at  in [place] for fracture rib on 1/1/24.  Patient was told to take Tylenol and/or Ibuprofen for pain.  Was told to contact PCP if this does not manage her pain.  Patient requesting strong pain medication to [facility].   Will check with pharmacy after 2pm.  Please advise

## 2024-01-15 ENCOUNTER — TELEPHONE (OUTPATIENT)
Dept: FAMILY MEDICINE CLINIC | Age: 64
End: 2024-01-15

## 2024-01-15 NOTE — TELEPHONE ENCOUNTER
Patient requesting refill of Tramadol for her rib pain.  \"Dr. Rodriguez\" at [facility] in [place] gave her some for her rib pain when she was seeing her for her knee.  Pharmacy is [facility] [address]  in [place].  Please advise.  Will check with pharmacy after 4pm

## 2024-01-16 ENCOUNTER — TELEMEDICINE (OUTPATIENT)
Dept: FAMILY MEDICINE CLINIC | Age: 64
End: 2024-01-16
Payer: MEDICAID

## 2024-01-16 DIAGNOSIS — S22.39XD CLOSED FRACTURE OF ONE RIB WITH ROUTINE HEALING, UNSPECIFIED LATERALITY, SUBSEQUENT ENCOUNTER: Primary | ICD-10-CM

## 2024-01-16 PROBLEM — A83.3: Status: ACTIVE | Noted: 2024-01-16

## 2024-01-16 PROBLEM — F25.9 SCHIZOAFFECTIVE DISORDER (HCC): Status: ACTIVE | Noted: 2024-01-16

## 2024-01-16 PROCEDURE — 99213 OFFICE O/P EST LOW 20 MIN: CPT | Performed by: NURSE PRACTITIONER

## 2024-01-16 RX ORDER — TIZANIDINE 4 MG/1
4 TABLET ORAL 3 TIMES DAILY PRN
Qty: 21 TABLET | Refills: 0 | Status: SHIPPED | OUTPATIENT
Start: 2024-01-16

## 2024-01-16 RX ORDER — ETODOLAC 400 MG/1
400 TABLET, FILM COATED ORAL 2 TIMES DAILY
Qty: 30 TABLET | Refills: 0 | Status: SHIPPED | OUTPATIENT
Start: 2024-01-16 | End: 2025-01-15

## 2024-01-16 ASSESSMENT — ENCOUNTER SYMPTOMS
COUGH: 0
NAUSEA: 0
ABDOMINAL PAIN: 0
SHORTNESS OF BREATH: 0
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
## 2024-02-01 DIAGNOSIS — R10.13 DYSPEPSIA: ICD-10-CM

## 2024-02-01 RX ORDER — FAMOTIDINE 40 MG/1
TABLET, FILM COATED ORAL
Qty: 30 TABLET | Refills: 11 | Status: SHIPPED | OUTPATIENT
Start: 2024-02-01

## 2024-05-22 DIAGNOSIS — F43.10 PTSD (POST-TRAUMATIC STRESS DISORDER): ICD-10-CM

## 2024-05-22 RX ORDER — ALPRAZOLAM 0.5 MG/1
TABLET ORAL
Qty: 60 TABLET | Refills: 5 | Status: SHIPPED | OUTPATIENT
Start: 2024-05-22 | End: 2024-11-18

## 2024-06-09 DIAGNOSIS — E11.42 DIABETIC PERIPHERAL NEUROPATHY (HCC): ICD-10-CM

## 2024-06-10 RX ORDER — FOLIC ACID 1 MG/1
1000 TABLET ORAL DAILY
Qty: 90 TABLET | Refills: 3 | Status: SHIPPED | OUTPATIENT
Start: 2024-06-10

## 2024-06-17 ENCOUNTER — OFFICE VISIT (OUTPATIENT)
Dept: FAMILY MEDICINE CLINIC | Age: 64
End: 2024-06-17
Payer: MEDICAID

## 2024-06-17 VITALS
DIASTOLIC BLOOD PRESSURE: 72 MMHG | WEIGHT: 200.1 LBS | RESPIRATION RATE: 16 BRPM | BODY MASS INDEX: 32.3 KG/M2 | HEART RATE: 68 BPM | SYSTOLIC BLOOD PRESSURE: 130 MMHG

## 2024-06-17 DIAGNOSIS — E55.9 VITAMIN D DEFICIENCY: ICD-10-CM

## 2024-06-17 DIAGNOSIS — F41.1 GENERALIZED ANXIETY DISORDER: ICD-10-CM

## 2024-06-17 DIAGNOSIS — M25.50 ARTHRALGIA OF MULTIPLE JOINTS: ICD-10-CM

## 2024-06-17 DIAGNOSIS — E66.1 CLASS 1 DRUG-INDUCED OBESITY WITH SERIOUS COMORBIDITY AND BODY MASS INDEX (BMI) OF 33.0 TO 33.9 IN ADULT: ICD-10-CM

## 2024-06-17 DIAGNOSIS — M32.12 SYSTEMIC LUPUS ERYTHEMATOSUS (SLE) WITH PERICARDITIS, UNSPECIFIED SLE TYPE (HCC): ICD-10-CM

## 2024-06-17 DIAGNOSIS — I87.2 CHRONIC VENOUS INSUFFICIENCY: ICD-10-CM

## 2024-06-17 DIAGNOSIS — M54.9 CHRONIC BACK PAIN, UNSPECIFIED BACK LOCATION, UNSPECIFIED BACK PAIN LATERALITY: ICD-10-CM

## 2024-06-17 DIAGNOSIS — G89.29 CHRONIC BACK PAIN, UNSPECIFIED BACK LOCATION, UNSPECIFIED BACK PAIN LATERALITY: ICD-10-CM

## 2024-06-17 DIAGNOSIS — E11.9 TYPE 2 DIABETES MELLITUS WITHOUT COMPLICATION, WITHOUT LONG-TERM CURRENT USE OF INSULIN (HCC): Primary | ICD-10-CM

## 2024-06-17 DIAGNOSIS — F43.10 PTSD (POST-TRAUMATIC STRESS DISORDER): ICD-10-CM

## 2024-06-17 PROCEDURE — 3075F SYST BP GE 130 - 139MM HG: CPT | Performed by: NURSE PRACTITIONER

## 2024-06-17 PROCEDURE — 99214 OFFICE O/P EST MOD 30 MIN: CPT | Performed by: NURSE PRACTITIONER

## 2024-06-17 PROCEDURE — G2211 COMPLEX E/M VISIT ADD ON: HCPCS | Performed by: NURSE PRACTITIONER

## 2024-06-17 PROCEDURE — 3078F DIAST BP <80 MM HG: CPT | Performed by: NURSE PRACTITIONER

## 2024-06-17 RX ORDER — FLUOXETINE HYDROCHLORIDE 20 MG/1
20 CAPSULE ORAL DAILY
Qty: 90 CAPSULE | Refills: 3 | Status: SHIPPED | OUTPATIENT
Start: 2024-06-17

## 2024-06-17 ASSESSMENT — PATIENT HEALTH QUESTIONNAIRE - PHQ9
SUM OF ALL RESPONSES TO PHQ QUESTIONS 1-9: 0
SUM OF ALL RESPONSES TO PHQ QUESTIONS 1-9: 0
3. TROUBLE FALLING OR STAYING ASLEEP: NOT AT ALL
1. LITTLE INTEREST OR PLEASURE IN DOING THINGS: NOT AT ALL
8. MOVING OR SPEAKING SO SLOWLY THAT OTHER PEOPLE COULD HAVE NOTICED. OR THE OPPOSITE, BEING SO FIGETY OR RESTLESS THAT YOU HAVE BEEN MOVING AROUND A LOT MORE THAN USUAL: NOT AT ALL
4. FEELING TIRED OR HAVING LITTLE ENERGY: NOT AT ALL
9. THOUGHTS THAT YOU WOULD BE BETTER OFF DEAD, OR OF HURTING YOURSELF: NOT AT ALL
SUM OF ALL RESPONSES TO PHQ QUESTIONS 1-9: 0
2. FEELING DOWN, DEPRESSED OR HOPELESS: NOT AT ALL
SUM OF ALL RESPONSES TO PHQ QUESTIONS 1-9: 0
6. FEELING BAD ABOUT YOURSELF - OR THAT YOU ARE A FAILURE OR HAVE LET YOURSELF OR YOUR FAMILY DOWN: NOT AT ALL
7. TROUBLE CONCENTRATING ON THINGS, SUCH AS READING THE NEWSPAPER OR WATCHING TELEVISION: NOT AT ALL
5. POOR APPETITE OR OVEREATING: NOT AT ALL
SUM OF ALL RESPONSES TO PHQ9 QUESTIONS 1 & 2: 0
10. IF YOU CHECKED OFF ANY PROBLEMS, HOW DIFFICULT HAVE THESE PROBLEMS MADE IT FOR YOU TO DO YOUR WORK, TAKE CARE OF THINGS AT HOME, OR GET ALONG WITH OTHER PEOPLE: NOT DIFFICULT AT ALL

## 2024-06-17 ASSESSMENT — ENCOUNTER SYMPTOMS
SHORTNESS OF BREATH: 0
BACK PAIN: 1
NAUSEA: 0
COUGH: 0
ABDOMINAL PAIN: 0
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
## 2024-09-18 ENCOUNTER — TELEPHONE (OUTPATIENT)
Dept: FAMILY MEDICINE CLINIC | Age: 64
End: 2024-09-18

## 2024-09-19 ENCOUNTER — TELEPHONE (OUTPATIENT)
Dept: FAMILY MEDICINE CLINIC | Age: 64
End: 2024-09-19

## 2024-09-19 DIAGNOSIS — S22.39XD CLOSED FRACTURE OF ONE RIB WITH ROUTINE HEALING, UNSPECIFIED LATERALITY, SUBSEQUENT ENCOUNTER: ICD-10-CM

## 2024-09-19 LAB
ESTIMATED AVERAGE GLUCOSE: 137 MG/DL
HBA1C MFR BLD: 6.4 % (ref 4.2–5.6)

## 2024-09-19 RX ORDER — ETODOLAC 400 MG
400 TABLET ORAL 2 TIMES DAILY
Qty: 30 TABLET | Refills: 0 | Status: SHIPPED | OUTPATIENT
Start: 2024-09-19 | End: 2024-10-04

## 2024-09-20 LAB
ANION GAP SERPL CALCULATED.3IONS-SCNC: 11 MEQ/L (ref 7–16)
BUN BLDV-MCNC: 7 MG/DL (ref 8–23)
CALCIUM SERPL-MCNC: 9.4 MG/DL (ref 8.5–10.5)
CHLORIDE BLD-SCNC: 100 MEQ/L (ref 95–107)
CO2: 27 MEQ/L (ref 19–31)
CREAT SERPL-MCNC: 1.01 MG/DL (ref 0.6–1.3)
EGFR IF NONAFRICAN AMERICAN: 63 ML/MIN/1.73
GLUCOSE: 107 MG/DL (ref 70–99)
POTASSIUM SERPL-SCNC: 4.5 MEQ/L (ref 3.5–5.4)
SODIUM BLD-SCNC: 138 MEQ/L (ref 133–146)

## 2024-10-04 ENCOUNTER — COMMUNITY OUTREACH (OUTPATIENT)
Dept: FAMILY MEDICINE CLINIC | Age: 64
End: 2024-10-04

## 2024-11-21 ENCOUNTER — TELEPHONE (OUTPATIENT)
Dept: FAMILY MEDICINE CLINIC | Age: 64
End: 2024-11-21

## 2024-11-21 DIAGNOSIS — R10.13 DYSPEPSIA: ICD-10-CM

## 2024-11-21 RX ORDER — FAMOTIDINE 40 MG/1
40 TABLET, FILM COATED ORAL NIGHTLY
Qty: 90 TABLET | Refills: 3 | Status: SHIPPED | OUTPATIENT
Start: 2024-11-21

## 2024-11-21 NOTE — TELEPHONE ENCOUNTER
Received a call from patient and she would like a refill on famotidine sent to [facility] pharmacy.     She will check with the pharmacy after 6:00 pm tonight.

## 2024-12-14 SDOH — ECONOMIC STABILITY: INCOME INSECURITY: HOW HARD IS IT FOR YOU TO PAY FOR THE VERY BASICS LIKE FOOD, HOUSING, MEDICAL CARE, AND HEATING?: NOT HARD AT ALL

## 2024-12-14 SDOH — ECONOMIC STABILITY: FOOD INSECURITY: WITHIN THE PAST 12 MONTHS, YOU WORRIED THAT YOUR FOOD WOULD RUN OUT BEFORE YOU GOT MONEY TO BUY MORE.: OFTEN TRUE

## 2024-12-14 SDOH — ECONOMIC STABILITY: FOOD INSECURITY: WITHIN THE PAST 12 MONTHS, THE FOOD YOU BOUGHT JUST DIDN'T LAST AND YOU DIDN'T HAVE MONEY TO GET MORE.: OFTEN TRUE

## 2024-12-17 ENCOUNTER — OFFICE VISIT (OUTPATIENT)
Dept: FAMILY MEDICINE CLINIC | Age: 64
End: 2024-12-17

## 2024-12-17 VITALS
RESPIRATION RATE: 20 BRPM | BODY MASS INDEX: 31.85 KG/M2 | WEIGHT: 197.3 LBS | DIASTOLIC BLOOD PRESSURE: 82 MMHG | HEART RATE: 64 BPM | SYSTOLIC BLOOD PRESSURE: 128 MMHG

## 2024-12-17 DIAGNOSIS — E55.9 VITAMIN D DEFICIENCY: ICD-10-CM

## 2024-12-17 DIAGNOSIS — I87.2 CHRONIC VENOUS INSUFFICIENCY: ICD-10-CM

## 2024-12-17 DIAGNOSIS — F41.1 GENERALIZED ANXIETY DISORDER: ICD-10-CM

## 2024-12-17 DIAGNOSIS — E66.1 CLASS 1 DRUG-INDUCED OBESITY WITH SERIOUS COMORBIDITY AND BODY MASS INDEX (BMI) OF 33.0 TO 33.9 IN ADULT: ICD-10-CM

## 2024-12-17 DIAGNOSIS — E11.9 TYPE 2 DIABETES MELLITUS WITHOUT COMPLICATION, WITHOUT LONG-TERM CURRENT USE OF INSULIN (HCC): Primary | ICD-10-CM

## 2024-12-17 DIAGNOSIS — E66.811 CLASS 1 DRUG-INDUCED OBESITY WITH SERIOUS COMORBIDITY AND BODY MASS INDEX (BMI) OF 33.0 TO 33.9 IN ADULT: ICD-10-CM

## 2024-12-17 DIAGNOSIS — M32.12 SYSTEMIC LUPUS ERYTHEMATOSUS (SLE) WITH PERICARDITIS, UNSPECIFIED SLE TYPE (HCC): ICD-10-CM

## 2024-12-17 DIAGNOSIS — F43.10 PTSD (POST-TRAUMATIC STRESS DISORDER): ICD-10-CM

## 2024-12-17 DIAGNOSIS — M25.50 ARTHRALGIA OF MULTIPLE JOINTS: ICD-10-CM

## 2024-12-17 RX ORDER — ASCORBIC ACID 500 MG
500 TABLET ORAL DAILY
Qty: 90 TABLET | Refills: 3 | Status: SHIPPED | OUTPATIENT
Start: 2024-12-17

## 2024-12-17 ASSESSMENT — ENCOUNTER SYMPTOMS
ABDOMINAL PAIN: 0
NAUSEA: 0
SHORTNESS OF BREATH: 0
COUGH: 0
BACK PAIN: 1

## 2024-12-17 NOTE — PATIENT INSTRUCTIONS
You may receive a survey about your visit with us today. The feedback from our patients helps us identify what is working well and where the service to all patients can be enhanced. Thank you!       [employer] Resources*  (Please call [address] if need more resources)      Medical  Alzheimer’s Association: [phone]  American Cancer Society: [phone]  American Heart Association: [phone]  American Lung Association: [phone]  Arthritis Foundation: [phone]  [place] of Services for Visually Impaired: [phone]  Kidney Foundation: [phone]    [employer] [facility]:  What they offer: Assist in finding resources to help pay hospital bills.  Phone Number: 1-493.762.5373  Website: https://www.[employer]/patient-resources/financial-assistance    [facility] [employer] Family Services:  What they offer: Medical coverage assistance for children, pregnant women, elderly, individuals with disabilities and those looking for long term care and/or in home waiver care.   Phone Number:  589.320.5934  Website: https://[employer]/services/medical-assistance    [facility] Agency on Aging:  What they offer: Aging and disability resource center, care management/coordination, transportation, wellness and prevention.  Phone Number: 240.614.4798        Utility  [facility] Community [facility] Partnership:  What they do: Help pay rent, utilities & internet bills.  Phone Number: 255.411.5244  Website: https://[employer]org/emergency-services/rent-assistance      [facility]:  What they do: They offer Utility assistance  Phone Number: 751.661.4848   Website: https://e[place]usa.[facility].org/[facility]/lima/equip-families    [place] [employer]:  What they offer: Temporary cash assistance to families to pay immediate needs while the adults of the families prepare and search for jobs.   Phone Number: 270.187.9304  Website: https://[employer]/services/cash-assistance     [place] [employer]*  (Call [employer]/211
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
## 2024-12-18 ENCOUNTER — TELEPHONE (OUTPATIENT)
Dept: FAMILY MEDICINE CLINIC | Age: 64
End: 2024-12-18

## 2024-12-18 LAB — VITAMIN D 25-HYDROXY: 35.8 NG/ML (ref 30–100)

## 2024-12-18 NOTE — TELEPHONE ENCOUNTER
Pt called office stating she forgot to ask for a note stating she was here for an appt yesterday. Pt says she will pull this note out of her MyChart. Please advise.

## 2024-12-19 ENCOUNTER — CARE COORDINATION (OUTPATIENT)
Dept: CARE COORDINATION | Age: 64
End: 2024-12-19

## 2024-12-19 NOTE — CARE COORDINATION
SW called pt after referral from [facility] Maggie Márquez. Introduced self and my role. Inquired about pt concerns/needs. Pt having financial issues due to \"past credit cards.\" Pt has \"closed accounts, but still need to pay the bill.\" Advised pt to take advantage of the food pantrys in [place] to free up some of her money. Pt shared her anxiety is better now that she is taking xanax. Discussed fear and how it can \"have a hold on us.\" Explored pt spiritual belief. Pt believes in prayer and scripture. Discussed God not wanting us to be fearful and 2 Arnol 1:7. Encouraged pt to read Pair[employer]. Pt agreeable. Pt will call SW with any additional needs or concerns. Pt requested [place] food pantry list be emailed to her. SW did that immediately. Will also mail hard copy to pt home address. SW will wait for a call back from pt as pt stated she \"gets way to many spam calls and that is what SW call came in as. Active listening provided.

## 2024-12-20 ENCOUNTER — TELEPHONE (OUTPATIENT)
Dept: FAMILY MEDICINE CLINIC | Age: 64
End: 2024-12-20

## 2024-12-20 DIAGNOSIS — F43.10 PTSD (POST-TRAUMATIC STRESS DISORDER): ICD-10-CM

## 2024-12-20 RX ORDER — ALPRAZOLAM 0.5 MG
TABLET ORAL
Qty: 60 TABLET | Refills: 5 | Status: SHIPPED | OUTPATIENT
Start: 2024-12-20 | End: 2025-06-18

## 2024-12-20 NOTE — TELEPHONE ENCOUNTER
Patient called and stated that she had a prescription for alprazolam to take as needed. She stated that she switched pharmacy and they are unable to switch the prescription.     She would like a prescription for alprazolam sent to [facility] in [place].     She will check with pharmacy after 12:00 today

## 2025-02-11 ENCOUNTER — TELEPHONE (OUTPATIENT)
Dept: FAMILY MEDICINE CLINIC | Age: 65
End: 2025-02-11

## 2025-02-11 NOTE — TELEPHONE ENCOUNTER
Pt called office stating she has an insurance claim with \"Michelle\" due to a fall she had last year in September 2024. She has a paper she has to sign for a record release to get records from our office regarding the fall. Advised pt there is only a TE regarding her fall on 9/19/2024. Pt voiced understanding. Says she is going to stop by the office with the record release.

## 2025-04-29 ENCOUNTER — COMMUNITY OUTREACH (OUTPATIENT)
Dept: FAMILY MEDICINE CLINIC | Age: 65
End: 2025-04-29

## 2025-06-24 ENCOUNTER — OFFICE VISIT (OUTPATIENT)
Dept: FAMILY MEDICINE CLINIC | Age: 65
End: 2025-06-24
Payer: MEDICAID

## 2025-06-24 VITALS
SYSTOLIC BLOOD PRESSURE: 124 MMHG | BODY MASS INDEX: 31.66 KG/M2 | RESPIRATION RATE: 18 BRPM | HEIGHT: 66 IN | WEIGHT: 197 LBS | DIASTOLIC BLOOD PRESSURE: 72 MMHG | HEART RATE: 80 BPM

## 2025-06-24 DIAGNOSIS — E66.1 CLASS 1 DRUG-INDUCED OBESITY WITH SERIOUS COMORBIDITY AND BODY MASS INDEX (BMI) OF 33.0 TO 33.9 IN ADULT: ICD-10-CM

## 2025-06-24 DIAGNOSIS — E66.811 CLASS 1 DRUG-INDUCED OBESITY WITH SERIOUS COMORBIDITY AND BODY MASS INDEX (BMI) OF 33.0 TO 33.9 IN ADULT: ICD-10-CM

## 2025-06-24 DIAGNOSIS — M25.571 CHRONIC PAIN OF RIGHT ANKLE: ICD-10-CM

## 2025-06-24 DIAGNOSIS — G89.29 CHRONIC PAIN OF RIGHT ANKLE: ICD-10-CM

## 2025-06-24 DIAGNOSIS — E11.9 TYPE 2 DIABETES MELLITUS WITHOUT COMPLICATION, WITHOUT LONG-TERM CURRENT USE OF INSULIN (HCC): Primary | ICD-10-CM

## 2025-06-24 DIAGNOSIS — F41.1 GENERALIZED ANXIETY DISORDER: ICD-10-CM

## 2025-06-24 DIAGNOSIS — E55.9 VITAMIN D DEFICIENCY: ICD-10-CM

## 2025-06-24 PROCEDURE — 99214 OFFICE O/P EST MOD 30 MIN: CPT | Performed by: NURSE PRACTITIONER

## 2025-06-24 PROCEDURE — 3078F DIAST BP <80 MM HG: CPT | Performed by: NURSE PRACTITIONER

## 2025-06-24 PROCEDURE — 3074F SYST BP LT 130 MM HG: CPT | Performed by: NURSE PRACTITIONER

## 2025-06-24 RX ORDER — MELOXICAM 7.5 MG/1
7.5 TABLET ORAL DAILY PRN
Qty: 90 TABLET | Refills: 3 | Status: SHIPPED | OUTPATIENT
Start: 2025-06-24

## 2025-06-24 RX ORDER — PREDNISONE 20 MG/1
20 TABLET ORAL 2 TIMES DAILY
Qty: 10 TABLET | Refills: 0 | Status: SHIPPED | OUTPATIENT
Start: 2025-06-24 | End: 2025-06-29

## 2025-06-24 SDOH — ECONOMIC STABILITY: FOOD INSECURITY: WITHIN THE PAST 12 MONTHS, YOU WORRIED THAT YOUR FOOD WOULD RUN OUT BEFORE YOU GOT MONEY TO BUY MORE.: SOMETIMES TRUE

## 2025-06-24 SDOH — ECONOMIC STABILITY: FOOD INSECURITY: WITHIN THE PAST 12 MONTHS, THE FOOD YOU BOUGHT JUST DIDN'T LAST AND YOU DIDN'T HAVE MONEY TO GET MORE.: SOMETIMES TRUE

## 2025-06-24 SDOH — ECONOMIC STABILITY: INCOME INSECURITY: HOW HARD IS IT FOR YOU TO PAY FOR THE VERY BASICS LIKE FOOD, HOUSING, MEDICAL CARE, AND HEATING?: SOMEWHAT HARD

## 2025-06-24 ASSESSMENT — PATIENT HEALTH QUESTIONNAIRE - PHQ9
SUM OF ALL RESPONSES TO PHQ QUESTIONS 1-9: 2
6. FEELING BAD ABOUT YOURSELF - OR THAT YOU ARE A FAILURE OR HAVE LET YOURSELF OR YOUR FAMILY DOWN: NOT AT ALL
2. FEELING DOWN, DEPRESSED OR HOPELESS: NOT AT ALL
3. TROUBLE FALLING OR STAYING ASLEEP: MORE THAN HALF THE DAYS
9. THOUGHTS THAT YOU WOULD BE BETTER OFF DEAD, OR OF HURTING YOURSELF: NOT AT ALL
10. IF YOU CHECKED OFF ANY PROBLEMS, HOW DIFFICULT HAVE THESE PROBLEMS MADE IT FOR YOU TO DO YOUR WORK, TAKE CARE OF THINGS AT HOME, OR GET ALONG WITH OTHER PEOPLE: NOT DIFFICULT AT ALL
1. LITTLE INTEREST OR PLEASURE IN DOING THINGS: NOT AT ALL
SUM OF ALL RESPONSES TO PHQ QUESTIONS 1-9: 2
SUM OF ALL RESPONSES TO PHQ QUESTIONS 1-9: 2
4. FEELING TIRED OR HAVING LITTLE ENERGY: NOT AT ALL
8. MOVING OR SPEAKING SO SLOWLY THAT OTHER PEOPLE COULD HAVE NOTICED. OR THE OPPOSITE, BEING SO FIGETY OR RESTLESS THAT YOU HAVE BEEN MOVING AROUND A LOT MORE THAN USUAL: NOT AT ALL
7. TROUBLE CONCENTRATING ON THINGS, SUCH AS READING THE NEWSPAPER OR WATCHING TELEVISION: NOT AT ALL
SUM OF ALL RESPONSES TO PHQ QUESTIONS 1-9: 2
5. POOR APPETITE OR OVEREATING: NOT AT ALL

## 2025-06-24 ASSESSMENT — ENCOUNTER SYMPTOMS
NAUSEA: 0
SHORTNESS OF BREATH: 0
BACK PAIN: 1
COUGH: 0
ABDOMINAL PAIN: 0

## 2025-06-24 NOTE — PATIENT INSTRUCTIONS
You may receive a survey about your visit with us today. The feedback from our patients helps us identify what is working well and where the service to all patients can be enhanced. Thank you!     [place] [employer]*  (Call United Way/[facility] if need more resources.)    Home Delivered Meals:  [employer] Meals: [phone] for people and pets  Home[employer] Bound Delivered Meals:  Phone: 1-591.919.1738  Eating [facility] ([place] only):  Phone: 422.979.5764  Moms Meals:  What they offer: Nutritious refrigerated meals. Available to individuals with Medicare advantage plans, Medicaid plans, Long-Term services and supports (LTSS) programs and self-pay at $7.99 a meal.  Renal-Friendly, pureed and Gluten free at $8.99 a meal plus flat rate shipping.   Phone: 1-303.530.8033  Website: https://www.[employer]    Senior Nutrition program:  Phone: 1-613.960.2170  Simply EZ Home Delivered Meals (From [place]):  What they offer: Meals for seniors, disabled, recovering from an injury or illness on [place] [facility]. Both privately and through homecare services like [facility].   Phone: 1-357.243.6292  Website: https://[employer].VISup    Other Food Resources:  MOW (Meals on Wheels) Contact Area Agency on Aging   Phone: 409.624.3460 or 1-401.678.3353    [facility]- Nutrition Program   What they offer: Nutrition education and nutritious foods for Pregnant women, women who just had a baby, breastfeeding moms, infants and children up to the age of 5.   Phone: 219.486.8449   Website: https://www.GroupSpaces.org  [facility]- Provides Meals  What they offer: Provide food boxes to those in need monthly.  Phone: 158.305.2322  Website: https://CoTweeta.org/ministries/[facility]  [employer]-Food/ Clothing   What they offer:  Provides a 3-day supply of food for individuals once a month.   Phone: 294.829.3642  Website:

## 2025-06-24 NOTE — PROGRESS NOTES
and nausea.   Musculoskeletal:  Positive for arthralgias and back pain.   Skin:  Negative for rash.   Neurological:  Negative for dizziness, light-headedness and headaches.   Psychiatric/Behavioral:  Positive for sleep disturbance. The patient is nervous/anxious.    All other systems reviewed and are negative.      Objective:   Physical Exam  Constitutional:       General: She is not in acute distress.  HENT:      Nose: No mucosal edema, congestion or rhinorrhea.   Eyes:      Pupils: Pupils are equal, round, and reactive to light.   Cardiovascular:      Rate and Rhythm: Normal rate and regular rhythm.      Heart sounds: No murmur heard.  Pulmonary:      Effort: Pulmonary effort is normal.      Breath sounds: Normal breath sounds. No wheezing.   Abdominal:      General: Bowel sounds are normal. There is no distension.      Palpations: Abdomen is soft.      Tenderness: There is no abdominal tenderness.   Musculoskeletal:         General: No tenderness. Normal range of motion.      Cervical back: Normal range of motion and neck supple.   Skin:     General: Skin is warm and dry.      Findings: No rash.          Assessment:       Diagnosis Orders   1. Type 2 diabetes mellitus without complication, without long-term current use of insulin ([facility])  CBC with Auto Differential    Comprehensive Metabolic Panel    Hemoglobin A1C    Albumin/Creatinine Ratio, Urine    HM DIABETES FOOT EXAM      2. Class 1 drug-induced obesity with serious comorbidity and body mass index (BMI) of 33.0 to 33.9 in adult  Lipid Panel    TSH reflex to FT4      3. Vitamin D deficiency  Vitamin D 25 Hydroxy      4. Generalized anxiety disorder        5. Chronic pain of right ankle  predniSONE (DELTASONE) 20 MG tablet    meloxicam (MOBIC) 7.5 MG tablet                      Plan:      Chronic conditions stable  Labs as above now  Refill as above   - Prednisone burst + Mobic Daily for maintenance  Follow up with Specialists  Diet, weight loss and exercise

## 2025-06-28 DIAGNOSIS — F43.10 PTSD (POST-TRAUMATIC STRESS DISORDER): ICD-10-CM

## 2025-06-30 RX ORDER — ALPRAZOLAM 0.5 MG
0.5 TABLET ORAL 2 TIMES DAILY PRN
Qty: 60 TABLET | Refills: 5 | Status: SHIPPED | OUTPATIENT
Start: 2025-06-30 | End: 2025-12-27

## 2025-07-16 LAB
ALBUMIN: 4 G/DL (ref 3.5–5.2)
ALK PHOSPHATASE: 67 U/L (ref 30–134)
ALT SERPL-CCNC: 8 U/L (ref 5–33)
ANION GAP SERPL CALCULATED.3IONS-SCNC: 13 MMOL/L (ref 7–16)
AST SERPL-CCNC: 23 U/L (ref 9–40)
BASOPHILS ABSOLUTE: 0.05 K/UL (ref 0–0.2)
BASOPHILS RELATIVE PERCENT: 0.7 % (ref 0–2)
BILIRUB SERPL-MCNC: 0.6 MG/DL
BUN BLDV-MCNC: 9 MG/DL (ref 8–23)
CALCIUM SERPL-MCNC: 9 MG/DL (ref 8.6–10.5)
CHLORIDE BLD-SCNC: 102 MMOL/L (ref 96–107)
CHOLESTEROL, TOTAL: 205 MG/DL (ref 100–199)
CHOLESTEROL/HDL RATIO: 3.7 (ref 2–4.5)
CO2: 26 MMOL/L (ref 18–32)
CREAT SERPL-MCNC: 0.94 MG/DL (ref 0.51–1.15)
CREATINE, URINE: 239 MG/DL
EGFR IF NONAFRICAN AMERICAN: 69 ML/MIN/1.73M2
EOSINOPHILS ABSOLUTE: 0.26 K/UL (ref 0–0.8)
EOSINOPHILS RELATIVE PERCENT: 3.6 % (ref 0–5)
ESTIMATED AVERAGE GLUCOSE: 154 MG/DL
GLUCOSE: 144 MG/DL (ref 70–100)
HBA1C MFR BLD: 7 %
HCT VFR BLD CALC: 41.5 % (ref 35–47)
HDLC SERPL-MCNC: 56 MG/DL
HEMOGLOBIN: 13.3 G/DL (ref 11.9–16)
IMMATURE GRANS (ABS): 0.01 K/UL (ref 0–0.06)
IMMATURE GRANULOCYTES %: 0.1 % (ref 0–2)
LDL CHOLESTEROL: 127 MG/DL
LDL/HDL RATIO: 2.3
LYMPHOCYTES ABSOLUTE: 1.4 K/UL (ref 0.9–5.2)
LYMPHOCYTES RELATIVE PERCENT: 19.6 % (ref 20–45)
MCH RBC QN AUTO: 26.5 PG (ref 26–33)
MCHC RBC AUTO-ENTMCNC: 32 G/DL (ref 32–35)
MCV RBC AUTO: 83 FL (ref 75–100)
MICROALBUMIN/CREAT 24H UR: 14.3 MG/L
MICROALBUMIN/CREAT UR-RTO: 6 MG/G
MONOCYTES ABSOLUTE: 0.57 K/UL (ref 0.1–1)
MONOCYTES RELATIVE PERCENT: 8 % (ref 0–13)
NEUTROPHILS ABSOLUTE: 4.85 K/UL (ref 1.9–8)
NEUTROPHILS RELATIVE PERCENT: 68 % (ref 45–75)
PDW BLD-RTO: 13.3 % (ref 11.2–14.8)
PLATELET # BLD: 285 THOUS/CMM (ref 140–440)
POTASSIUM SERPL-SCNC: 4 MMOL/L (ref 3.5–5.4)
RBC # BLD: 5.01 MILL/CMM (ref 3.8–5.2)
SODIUM BLD-SCNC: 141 MMOL/L (ref 135–148)
TOTAL PROTEIN: 6.8 G/DL (ref 6–8.3)
TRIGL SERPL-MCNC: 110 MG/DL (ref 20–149)
TSH SERPL DL<=0.05 MIU/L-ACNC: 0.86 UIU/ML (ref 0.27–4.2)
VITAMIN D 25-HYDROXY: 40 NG/ML (ref 30–100)
VLDLC SERPL CALC-MCNC: 22 MG/DL
WBC # BLD: 7.1 THDS/CMM (ref 3.6–11)

## 2025-07-17 ENCOUNTER — TELEPHONE (OUTPATIENT)
Dept: FAMILY MEDICINE CLINIC | Age: 65
End: 2025-07-17

## 2025-07-17 DIAGNOSIS — R05.1 ACUTE COUGH: Primary | ICD-10-CM

## 2025-07-17 RX ORDER — BENZONATATE 100 MG/1
100-200 CAPSULE ORAL 3 TIMES DAILY PRN
Qty: 60 CAPSULE | Refills: 0 | Status: SHIPPED | OUTPATIENT
Start: 2025-07-17 | End: 2025-07-24

## 2025-07-17 NOTE — TELEPHONE ENCOUNTER
Patient called and stated that she has a cough and scratchy throat. She denied any other symptoms just cough, scratchy throat and coughing up thick yellow phlegm.     She would like a prescription to help with the cough. She was given cough \"pearls\" in the past that helped.   She is using [facility] on [address].     She didn't know if you thought she would need an antibiotic or not.    She will check with pharmacy after 3:00 today